# Patient Record
Sex: FEMALE | Race: WHITE | Employment: FULL TIME | URBAN - METROPOLITAN AREA
[De-identification: names, ages, dates, MRNs, and addresses within clinical notes are randomized per-mention and may not be internally consistent; named-entity substitution may affect disease eponyms.]

---

## 2018-01-26 LAB
ABO + RH BLD: NORMAL
ABO + RH BLD: NORMAL
BLD GP AB SCN SERPL QL: NEGATIVE
HBV SURFACE AG SERPL QL IA: NON REACTIVE
HIV 1+2 AB+HIV1 P24 AG SERPL QL IA: NON REACTIVE
RUBELLA ANTIBODY IGG QUANTITATIVE: NORMAL IU/ML
T PALLIDUM IGG SER QL: NON REACTIVE

## 2018-08-10 LAB — GROUP B STREP PCR: NEGATIVE

## 2018-09-08 ENCOUNTER — HOSPITAL ENCOUNTER (INPATIENT)
Facility: CLINIC | Age: 33
LOS: 3 days | Discharge: HOME | End: 2018-09-11
Attending: REGISTERED NURSE | Admitting: REGISTERED NURSE
Payer: COMMERCIAL

## 2018-09-08 ENCOUNTER — ANESTHESIA (OUTPATIENT)
Dept: OBGYN | Facility: CLINIC | Age: 33
End: 2018-09-08
Payer: COMMERCIAL

## 2018-09-08 ENCOUNTER — ANESTHESIA EVENT (OUTPATIENT)
Dept: OBGYN | Facility: CLINIC | Age: 33
End: 2018-09-08
Payer: COMMERCIAL

## 2018-09-08 PROBLEM — O26.90 PREGNANCY RELATED CONDITION: Status: ACTIVE | Noted: 2018-09-08

## 2018-09-08 LAB
ABO + RH BLD: NORMAL
ABO + RH BLD: NORMAL
SPECIMEN EXP DATE BLD: NORMAL

## 2018-09-08 PROCEDURE — G0463 HOSPITAL OUTPT CLINIC VISIT: HCPCS | Mod: 25

## 2018-09-08 PROCEDURE — 3E0R3BZ INTRODUCTION OF ANESTHETIC AGENT INTO SPINAL CANAL, PERCUTANEOUS APPROACH: ICD-10-PCS | Performed by: ANESTHESIOLOGY

## 2018-09-08 PROCEDURE — 86901 BLOOD TYPING SEROLOGIC RH(D): CPT | Performed by: REGISTERED NURSE

## 2018-09-08 PROCEDURE — 25000125 ZZHC RX 250: Performed by: ANESTHESIOLOGY

## 2018-09-08 PROCEDURE — 12000029 ZZH R&B OB INTERMEDIATE

## 2018-09-08 PROCEDURE — 86780 TREPONEMA PALLIDUM: CPT | Performed by: REGISTERED NURSE

## 2018-09-08 PROCEDURE — 25000128 H RX IP 250 OP 636: Performed by: REGISTERED NURSE

## 2018-09-08 PROCEDURE — 25000128 H RX IP 250 OP 636: Performed by: ANESTHESIOLOGY

## 2018-09-08 PROCEDURE — 00HU33Z INSERTION OF INFUSION DEVICE INTO SPINAL CANAL, PERCUTANEOUS APPROACH: ICD-10-PCS | Performed by: ANESTHESIOLOGY

## 2018-09-08 PROCEDURE — 86900 BLOOD TYPING SEROLOGIC ABO: CPT | Performed by: REGISTERED NURSE

## 2018-09-08 PROCEDURE — 37000011 ZZH ANESTHESIA WARD SERVICE

## 2018-09-08 PROCEDURE — 59025 FETAL NON-STRESS TEST: CPT

## 2018-09-08 PROCEDURE — 27110038 ZZH RX 271: Performed by: ANESTHESIOLOGY

## 2018-09-08 PROCEDURE — 36415 COLL VENOUS BLD VENIPUNCTURE: CPT | Performed by: REGISTERED NURSE

## 2018-09-08 RX ORDER — LIDOCAINE HYDROCHLORIDE AND EPINEPHRINE 15; 5 MG/ML; UG/ML
3 INJECTION, SOLUTION EPIDURAL
Status: COMPLETED | OUTPATIENT
Start: 2018-09-08 | End: 2018-09-08

## 2018-09-08 RX ORDER — PRENATAL VIT/IRON FUM/FOLIC AC 27MG-0.8MG
1 TABLET ORAL DAILY
COMMUNITY

## 2018-09-08 RX ORDER — SODIUM CHLORIDE, SODIUM LACTATE, POTASSIUM CHLORIDE, CALCIUM CHLORIDE 600; 310; 30; 20 MG/100ML; MG/100ML; MG/100ML; MG/100ML
INJECTION, SOLUTION INTRAVENOUS CONTINUOUS
Status: DISCONTINUED | OUTPATIENT
Start: 2018-09-08 | End: 2018-09-09

## 2018-09-08 RX ORDER — OXYCODONE AND ACETAMINOPHEN 5; 325 MG/1; MG/1
1 TABLET ORAL
Status: DISCONTINUED | OUTPATIENT
Start: 2018-09-08 | End: 2018-09-09

## 2018-09-08 RX ORDER — OXYTOCIN 10 [USP'U]/ML
10 INJECTION, SOLUTION INTRAMUSCULAR; INTRAVENOUS
Status: DISCONTINUED | OUTPATIENT
Start: 2018-09-08 | End: 2018-09-09

## 2018-09-08 RX ORDER — FENTANYL CITRATE 50 UG/ML
100 INJECTION, SOLUTION INTRAMUSCULAR; INTRAVENOUS ONCE
Status: COMPLETED | OUTPATIENT
Start: 2018-09-08 | End: 2018-09-08

## 2018-09-08 RX ORDER — FENTANYL CITRATE 50 UG/ML
100 INJECTION, SOLUTION INTRAMUSCULAR; INTRAVENOUS ONCE
Status: DISCONTINUED | OUTPATIENT
Start: 2018-09-08 | End: 2018-09-08

## 2018-09-08 RX ORDER — NALOXONE HYDROCHLORIDE 0.4 MG/ML
.1-.4 INJECTION, SOLUTION INTRAMUSCULAR; INTRAVENOUS; SUBCUTANEOUS
Status: DISCONTINUED | OUTPATIENT
Start: 2018-09-08 | End: 2018-09-09

## 2018-09-08 RX ORDER — CARBOPROST TROMETHAMINE 250 UG/ML
250 INJECTION, SOLUTION INTRAMUSCULAR
Status: DISCONTINUED | OUTPATIENT
Start: 2018-09-08 | End: 2018-09-09

## 2018-09-08 RX ORDER — EPHEDRINE SULFATE 50 MG/ML
5 INJECTION, SOLUTION INTRAMUSCULAR; INTRAVENOUS; SUBCUTANEOUS
Status: DISCONTINUED | OUTPATIENT
Start: 2018-09-08 | End: 2018-09-09

## 2018-09-08 RX ORDER — NALBUPHINE HYDROCHLORIDE 10 MG/ML
2.5-5 INJECTION, SOLUTION INTRAMUSCULAR; INTRAVENOUS; SUBCUTANEOUS EVERY 6 HOURS PRN
Status: DISCONTINUED | OUTPATIENT
Start: 2018-09-08 | End: 2018-09-09

## 2018-09-08 RX ORDER — EVE PRIMROSE/LINOLEIC/G-LENIC 1000 MG
CAPSULE ORAL
Status: ON HOLD | COMMUNITY
End: 2018-09-11

## 2018-09-08 RX ORDER — ROPIVACAINE HYDROCHLORIDE 2 MG/ML
10 INJECTION, SOLUTION EPIDURAL; INFILTRATION; PERINEURAL ONCE
Status: COMPLETED | OUTPATIENT
Start: 2018-09-08 | End: 2018-09-08

## 2018-09-08 RX ORDER — IBUPROFEN 400 MG/1
800 TABLET, FILM COATED ORAL
Status: DISCONTINUED | OUTPATIENT
Start: 2018-09-08 | End: 2018-09-09

## 2018-09-08 RX ORDER — ACETAMINOPHEN 325 MG/1
650 TABLET ORAL EVERY 4 HOURS PRN
Status: DISCONTINUED | OUTPATIENT
Start: 2018-09-08 | End: 2018-09-09

## 2018-09-08 RX ORDER — ONDANSETRON 2 MG/ML
4 INJECTION INTRAMUSCULAR; INTRAVENOUS EVERY 6 HOURS PRN
Status: DISCONTINUED | OUTPATIENT
Start: 2018-09-08 | End: 2018-09-09

## 2018-09-08 RX ORDER — ONDANSETRON 2 MG/ML
4 INJECTION INTRAMUSCULAR; INTRAVENOUS EVERY 6 HOURS PRN
Status: DISCONTINUED | OUTPATIENT
Start: 2018-09-08 | End: 2018-09-08

## 2018-09-08 RX ORDER — METHYLERGONOVINE MALEATE 0.2 MG/ML
200 INJECTION INTRAVENOUS
Status: DISCONTINUED | OUTPATIENT
Start: 2018-09-08 | End: 2018-09-09

## 2018-09-08 RX ORDER — OXYTOCIN/0.9 % SODIUM CHLORIDE 30/500 ML
100-340 PLASTIC BAG, INJECTION (ML) INTRAVENOUS CONTINUOUS PRN
Status: COMPLETED | OUTPATIENT
Start: 2018-09-08 | End: 2018-09-09

## 2018-09-08 RX ADMIN — SODIUM CHLORIDE, POTASSIUM CHLORIDE, SODIUM LACTATE AND CALCIUM CHLORIDE: 600; 310; 30; 20 INJECTION, SOLUTION INTRAVENOUS at 17:52

## 2018-09-08 RX ADMIN — FENTANYL CITRATE 100 MCG: 50 INJECTION INTRAMUSCULAR; INTRAVENOUS at 15:34

## 2018-09-08 RX ADMIN — LIDOCAINE HYDROCHLORIDE,EPINEPHRINE BITARTRATE 3 ML: 15; .005 INJECTION, SOLUTION EPIDURAL; INFILTRATION; INTRACAUDAL; PERINEURAL at 17:40

## 2018-09-08 RX ADMIN — SODIUM CHLORIDE, POTASSIUM CHLORIDE, SODIUM LACTATE AND CALCIUM CHLORIDE 1000 ML: 600; 310; 30; 20 INJECTION, SOLUTION INTRAVENOUS at 16:57

## 2018-09-08 RX ADMIN — Medication 12 ML/HR: at 17:44

## 2018-09-08 RX ADMIN — ROPIVACAINE HYDROCHLORIDE 15 ML: 2 INJECTION, SOLUTION EPIDURAL; INFILTRATION at 17:40

## 2018-09-08 RX ADMIN — SODIUM CHLORIDE, POTASSIUM CHLORIDE, SODIUM LACTATE AND CALCIUM CHLORIDE 500 ML: 600; 310; 30; 20 INJECTION, SOLUTION INTRAVENOUS at 22:41

## 2018-09-08 NOTE — ANESTHESIA PREPROCEDURE EVALUATION
Anesthesia Evaluation       history and physical reviewed .      No history of anesthetic complications          ROS/MED HX    ENT/Pulmonary:     (+)asthma , . .    Neurologic:       Cardiovascular:         METS/Exercise Tolerance:     Hematologic:         Musculoskeletal:         GI/Hepatic:         Renal/Genitourinary:         Endo:         Psychiatric:         Infectious Disease:         Malignancy:         Other:                                    Anesthesia Plan      History & Physical Review      ASA Status:  .  OB Epidural Asa: 2            Postoperative Care      Consents  Anesthetic plan, risks, benefits and alternatives discussed with:  Patient..                          .

## 2018-09-08 NOTE — ANESTHESIA PROCEDURE NOTES
Peripheral nerve/Neuraxial procedure note : epidural catheter  Pre-Procedure  Performed by BILLY GROSS  Location: OB      Pre-Anesthestic Checklist: patient identified, IV checked, risks and benefits discussed, informed consent and monitors and equipment checked    Timeout  Correct Patient: Yes   Correct Procedure: Yes   Correct Site: Yes   Correct Laterality: N/A   Correct Position: Yes   Site Marked: N/A   .   Procedure Documentation    .    Procedure:    Epidural catheter.  Insertion Site:L3-4  (midline approach) Injection technique: LORT saline   Local skin infiltrated with 3 mL of 1% lidocaine.  ELOY at 5 cm     Patient Prep;povidone-iodine 7.5% surgical scrub.  .  Needle: ToMetaCerty needle Needle Gauge: 17.    Needle Length (Inches) 3.5  # of attempts: 1 and # of redirects:  .   . .  Catheter threaded easily  5 cm epidural space.  10 cm at skin.   .    Assessment/Narrative  Paresthesias: No.  .  .  Aspiration negative for heme or CSF  . Test dose of 3 mL lidocaine 1.5% w/ 1:200,000 epinephrine at. Test dose negative for signs of intravascular, subdural or intrathecal injection. Comments:  Labor Epidural  No complications.

## 2018-09-08 NOTE — IP AVS SNAPSHOT
MRN:[id number]                      After Visit Summary   9/8/2018    Oralia Wright    MRN: [id number]           Thank you!     Thank you for choosing [facility] for your care. Our goal is always to provide you with excellent care. Hearing back from our patients is one way we can continue to improve our services. Please take a few minutes to complete the written survey that you may receive in the mail after you visit with us. Thank you!        Patient Information     Date Of Birth          1985        Designated Caregiver       Most Recent Value    Caregiver    Will someone help with your care after discharge? yes    Name of designated caregiver Eloy Wright    Phone number of caregiver [phone]    Caregiver address [address] [phone]      About your hospital stay     You were admitted on:  September 8, 2018 You last received care in the:  [address]    You were discharged on:  September 11, 2018        Reason for your hospital stay       Maternity care                  Who to Call     For medical emergencies, please call 911.  For non-urgent questions about your medical care, please call your primary care provider or clinic, None          Attending Provider     Provider Specialty    Pema Ceron APRN [facility]       Primary Care Provider Fax #    [facility] [phone]      After Care Instructions     Activity       Review discharge instructions            Diet       Resume previous diet            Discharge Instructions - Postpartum visit       Please schedule appointment in clinic at 6 weeks postpartum or sooner as needed.                  Follow-up Appointments     Follow Up and recommended labs and tests       Please schedule appointment in clinic at 6 weeks postpartum or sooner as needed.                  Further instructions from your care team       Postpartum Vaginal Delivery  Instructions    Activity       Ask family and friends for help when you need it.    Do not place anything in your vagina for 6 weeks.    You are not restricted on other activities, but take it easy for a few weeks to allow your body to recover from delivery.  You are able to do any activities you feel up to that point.    No driving until you have stopped taking your pain medications (usually two weeks after delivery).     Call your health care provider if you have any of these symptoms:       Increased pain, swelling, redness, or fluid around your stiches from an episiotomy or perineal tear.    A fever above 100.4 F (38 C) with or without chills when placing a thermometer under your tongue.    You soak a sanitary pad with blood within 1 hour, or you see blood clots larger than a golf ball.    Bleeding that lasts more than 6 weeks.    Vaginal discharge that smells bad.    Severe pain, cramping or tenderness in your lower belly area.    A need to urinate more frequently (use the toilet more often), more urgently (use the toilet very quickly), or it burns when you urinate.    Nausea and vomiting.    Redness, swelling or pain around a vein in your leg.    Problems breastfeeding or a red or painful area on your breast.    Chest pain and cough or are gasping for air.    Problems coping with sadness, anxiety, or depression.  If you have any concerns about hurting yourself or the baby, call your provider immediately.     You have questions or concerns after you return home.     Keep your hands clean:  Always wash your hands before touching your perineal area and stitches.  This helps reduce your risk of infection.  If your hands aren't dirty, you may use an alcohol hand-rub to clean your hands. Keep your nails clean and short.        Pending Results     No orders found from 9/6/2018 to 9/9/2018.            Statement of Approval     Ordered          09/11/18 0830  I have reviewed and agree with all the recommendations and  "orders detailed in this document.  EFFECTIVE NOW     Approved and electronically signed by:  Felicia Perkins APRN CNM             Admission Information     Date & Time Provider Department Dept. Phone    2018 Pema Ceron APRN CNM [address] [phone]      Your Vitals Were     Blood Pressure Pulse Temperature Respirations Height Weight    111/67 83 98.1  F (36.7  C) (Oral) 16 1.626 m (5' 4\") 78.9 kg (174 lb)    Pulse Oximetry BMI (Body Mass Index)                100% 29.87 kg/m2          [employer] Information     [employer] lets you send messages to your doctor, view your test results, renew your prescriptions, schedule appointments and more. To sign up, go to [email or web address] . Click on \"Log in\" on the left side of the screen, which will take you to the Welcome page. Then click on \"Sign up Now\" on the right side of the page.     You will be asked to enter the access code listed below, as well as some personal information. Please follow the directions to create your username and password.     Your access code is: [email or web address]  Expires: 12/10/2018  1:20 PM     Your access code will  in 90 days. If you need help or a new code, please call your [place] clinic or [phone].        Care EveryWhere ID     This is your Care EveryWhere ID. This could be used by other organizations to access your [place] medical records  [id number]        Equal Access to Services     [facility] : Hadii tania kco Socharlyali, waaxda luqadaha, qaybta kaalmada adeegyarachel, mary lou mckeon. So [facility] [phone].    ATENCIÓN: Si habla español, tiene a richter disposición servicios gratuitos de asistencia lingüística. Llame al [phone].    We comply with applicable federal civil rights laws and [place] laws. We do not discriminate on the basis of race, color, national origin, age, disability, sex, sexual orientation, or gender identity.             "   Review of your medicines      START taking        Dose / Directions    acetaminophen 325 MG tablet   Commonly known as:  TYLENOL        Dose:  650 mg   Take 2 tablets (650 mg) by mouth every 6 hours as needed for mild pain or fever (greater than or equal to 38? C /100.4? F (oral) or 38.5? C/ 101.4? F (core).)   Quantity:  100 tablet   Refills:  0       ferrous sulfate 325 (65 Fe) MG tablet   Commonly known as:  IRON        Dose:  325 mg   Take 1 tablet (325 mg) by mouth 2 times daily   Quantity:  30 tablet   Refills:  2       ibuprofen 600 MG tablet   Commonly known as:  ADVIL/MOTRIN        Dose:  600 mg   Take 1 tablet (600 mg) by mouth every 6 hours as needed for moderate pain or other (cramping)   Refills:  0       oxyCODONE IR 5 MG tablet   Commonly known as:  ROXICODONE        Dose:  5 mg   Take 1 tablet (5 mg) by mouth every 6 hours as needed for severe pain or other (pain control or improvement in physical function.)   Quantity:  12 tablet   Refills:  0       senna-docusate 8.6-50 MG per tablet   Commonly known as:  SENOKOT-S;PERICOLACE        Dose:  1 tablet   Take 1 tablet by mouth 2 times daily as needed for constipation   Quantity:  100 tablet   Refills:  0         CONTINUE these medicines which have NOT CHANGED        Dose / Directions    adapalene 0.1 % cream   Commonly known as:  DIFFERIN        Apply topically 2 times daily   Refills:  0       ADDERALL XR PO        Dose:  30 mg   Take 30 mg by mouth 2 times daily   Refills:  0       albuterol 108 (90 Base) MCG/ACT inhaler   Commonly known as:  PROAIR HFA/PROVENTIL HFA/VENTOLIN HFA        Dose:  2 puff   Inhale 2 puffs into the lungs every 6 hours   Refills:  0       clindamycin 1 % solution   Commonly known as:  CLEOCIN T        Apply topically 2 times daily   Refills:  0       mometasone-formoterol 100-5 MCG/ACT oral inhaler   Commonly known as:  DULERA        Dose:  2 puff   Inhale 2 puffs into the lungs 2 times daily   Refills:  0       prenatal  multivitamin plus iron 27-0.8 MG Tabs per tablet   Indication:  Pregnancy        Dose:  1 tablet   Take 1 tablet by mouth daily   Refills:  0       SERTRALINE HCL PO        Dose:  50 mg   Take 50 mg by mouth daily   Refills:  0         STOP taking     Evening Primrose Oil 1000 MG Caps                Where to get your medicines      Some of these will need a paper prescription and others can be bought over the counter. Ask your nurse if you have questions.     Bring a paper prescription for each of these medications     oxyCODONE IR 5 MG tablet       You don't need a prescription for these medications     acetaminophen 325 MG tablet    ferrous sulfate 325 (65 Fe) MG tablet    ibuprofen 600 MG tablet    senna-docusate 8.6-50 MG per tablet                Protect others around you: Learn how to safely use, store and throw away your medicines at www.disposemymeds.org.        Information about OPIOIDS     PRESCRIPTION OPIOIDS: WHAT YOU NEED TO KNOW   We gave you an opioid (narcotic) pain medicine. It is important to manage your pain, but opioids are not always the best choice. You should first try all the other options your care team gave you. Take this medicine for as short a time (and as few doses) as possible.    Some activities can increase your pain, such as bandage changes or therapy sessions. It may help to take your pain medicine 30 to 60 minutes before these activities. Reduce your stress by getting enough sleep, working on hobbies you enjoy and practicing relaxation or meditation. Talk to your care team about ways to manage your pain beyond prescription opioids.    These medicines have risks:    DO NOT drive when on new or higher doses of pain medicine. These medicines can affect your alertness and reaction times, and you could be arrested for driving under the influence (DUI). If you need to use opioids long-term, talk to your care team about driving.    DO NOT operate heavy machinery    DO NOT do any other  dangerous activities while taking these medicines.    DO NOT drink any alcohol while taking these medicines.     If the opioid prescribed includes acetaminophen, DO NOT take with any other medicines that contain acetaminophen. Read all labels carefully. Look for the word  acetaminophen  or  Tylenol.  Ask your pharmacist if you have questions or are unsure.    You can get addicted to pain medicines, especially if you have a history of addiction (chemical, alcohol or substance dependence). Talk to your care team about ways to reduce this risk.    All opioids tend to cause constipation. Drink plenty of water and eat foods that have a lot of fiber, such as fruits, vegetables, prune juice, apple juice and high-fiber cereal. Take a laxative (Miralax, milk of magnesia, Colace, Senna) if you don t move your bowels at least every other day. Other side effects include upset stomach, sleepiness, dizziness, throwing up, tolerance (needing more of the medicine to have the same effect), physical dependence and slowed breathing.    Store your pills in a secure place, locked if possible. We will not replace any lost or stolen medicine. If you don t finish your medicine, please throw away (dispose) as directed by your pharmacist. The [place] Pollution Control Agency has more information about safe disposal: https://www.pca.[facility].[place].us/living-green/managing-unwanted-medications             Medication List: This is a list of all your medications and when to take them. Check marks below indicate your daily home schedule. Keep this list as a reference.      Medications           Morning Afternoon Evening Bedtime As Needed    acetaminophen 325 MG tablet   Commonly known as:  TYLENOL   Take 2 tablets (650 mg) by mouth every 6 hours as needed for mild pain or fever (greater than or equal to 38? C /100.4? F (oral) or 38.5? C/ 101.4? F (core).)   Last time this was given:  975 mg on 9/11/2018  7:18 AM                                 adapalene 0.1 % cream   Commonly known as:  DIFFERIN   Apply topically 2 times daily                                ADDERALL XR PO   Take 30 mg by mouth 2 times daily                                albuterol 108 (90 Base) MCG/ACT inhaler   Commonly known as:  PROAIR HFA/PROVENTIL HFA/VENTOLIN HFA   Inhale 2 puffs into the lungs every 6 hours                                clindamycin 1 % solution   Commonly known as:  CLEOCIN T   Apply topically 2 times daily                                ferrous sulfate 325 (65 Fe) MG tablet   Commonly known as:  IRON   Take 1 tablet (325 mg) by mouth 2 times daily                                ibuprofen 600 MG tablet   Commonly known as:  ADVIL/MOTRIN   Take 1 tablet (600 mg) by mouth every 6 hours as needed for moderate pain or other (cramping)   Last time this was given:  800 mg on 9/11/2018  7:18 AM                                mometasone-formoterol 100-5 MCG/ACT oral inhaler   Commonly known as:  DULERA   Inhale 2 puffs into the lungs 2 times daily                                oxyCODONE IR 5 MG tablet   Commonly known as:  ROXICODONE   Take 1 tablet (5 mg) by mouth every 6 hours as needed for severe pain or other (pain control or improvement in physical function.)   Last time this was given:  5 mg on 9/11/2018 12:00 PM                                prenatal multivitamin plus iron 27-0.8 MG Tabs per tablet   Take 1 tablet by mouth daily                                senna-docusate 8.6-50 MG per tablet   Commonly known as:  SENOKOT-S;PERICOLACE   Take 1 tablet by mouth 2 times daily as needed for constipation   Last time this was given:  2 tablets on 9/11/2018  9:36 AM                                SERTRALINE HCL PO   Take 50 mg by mouth daily

## 2018-09-08 NOTE — PROGRESS NOTES
1025- 40+5 weeks pt of Pema Flescher to [facility] from home stating she has been naina since about 0100 today.  Denies leaking fluid states she has had some red blood on panties.  Denies problems with pregnancy.  EUM/US explained and applied with pt permission.  Admission data base obtained.  See flow sheet.   1105- SVE 1+ 95%.  UC q 5-6 minutes, palp mild to moderate.  Pt breaths well through contractions.  Denies needs at this time.  Support given.  Call to Pema. Updated on pt arrival, SVE, contraction pattern, pain and reassuring NST but not reactive at this time.  Orders received to observe and recheck cervix in one and updated.  1215- POC reviewed with pt. Water and juice given.  1250- Pema on unit.  POC reviewed with this nurse  1305-SVE no change.  Pema at bedside.  POC reviewed with pt.

## 2018-09-08 NOTE — H&P
[facility] Labor and Delivery History and Physical    Oralia Wright MRN# [id number]   Age: 33 year old YOB: 1985     Date of Admission:  2018    Primary care provider: [facility]           Chief Complaint:   Oralia Wright is a 33 year old  who is 40w5d pregnant and being admitted for observation. She called after hours number at 0730 this morning c/o painful ctxs every 5-6mins. At that time, she was having difficulty talking to me on the phone. I suggested that she go to [facility] for evaluation. She did not show up, so I called back at 0925. She had gone to her parents house which is closer to the hospital but decided to wait to go in. At that time she reported that ctxs had spaced out to 7-8 min apart. She was having some light bleeding, +fm, denied rectal pressure. At 1015 she called back to let me know that she was on the way to the hospital. When she arrived she was 1.5/95/-3, posterior and soft, per RN report (Jacobs 6). She was 1/70/-4, vertex in the office yesterday. Pt was breathing through ctxs. Decision made to recheck cervix in 1 hr and there was no change.           Pregnancy history:     OBSTETRIC HISTORY: CPC seen on 20 week US. These resolved. Pt has had some mild anemia.     Obstetric History       T0      L0     SAB0   TAB0   Ectopic0   Multiple0   Live Births0       # Outcome Date GA Lbr Eb/2nd Weight Sex Delivery Anes PTL Lv   1 Current                   EDC: Estimated Date of Delivery: Sep 3, 2018    Prenatal Labs:   Lab Results   Component Value Date    ABO A 2018    RH Pos 2018    AS negative 2018    HEPBANG non reactive 2018    CHPCRT  2005     Negative for C. trachomatis rRNA by transcription mediated amplification.    GCPCRT  2005     Negative for N. gonorrhoeae rRNA by transcription mediated amplification.    TREPAB non reactive 2018    HGB 12.1 2005  "      GBS Status: Negative      Active Problem List  Patient Active Problem List   Diagnosis     Pregnancy related condition       Medication Prior to Admission  Prescriptions Prior to Admission   Medication Sig Dispense Refill Last Dose     Evening Primrose Oil 1000 MG CAPS    9/7/2018 at Unknown time     Prenatal Vit-Fe Fumarate-FA (PRENATAL MULTIVITAMIN PLUS IRON) 27-0.8 MG TABS per tablet Take 1 tablet by mouth daily   9/7/2018 at Unknown time   .        Maternal Past Medical History:     Past Medical History:   Diagnosis Date     Acne      Attention deficit      Depression     stable at present     Uncomplicated asthma     exercise induced                          Social History:   I have reviewed this patient's social history          Physical Exam:   Vitals were reviewed  Blood pressure 116/70, temperature 98.4  F (36.9  C), temperature source Temporal, resp. rate 18, height 1.626 m (5' 4\"), weight 78.9 kg (174 lb).  Constitutional:   awake, alert, cooperative, struggles and needs to vocalize with ctxs, and appears stated age      Cervix: deferred  Fetal Heart Rate Tracing: Category 1  Tocometer: frequency q 4-6 minutes                       Assessment:   Oralia Wright is a 40w5d pregnant female admitted for observation and pain management in probable early labor. Gave pt the option of going home vs staying as an observation pt or being admitted as a labor pt and augmented with Pitocin. She opts to be admitted as an observation pt. Advised her that if she is not in active labor by evening but ctxs are still regular and painful, she should consider augmentation. She understands that she can only be an observation pt for 24 hrs, before a new plan will need to be made.           Plan:   Observation  Pain medication is acceptable including fentanyl and nitrous oxide. NO EPIDURAL.   Dr Otto aware of plan.     Pema Ceron, WILLIAM PADILLAM  "

## 2018-09-08 NOTE — IP AVS SNAPSHOT
[address]    [place] [address]    Phone:  [phone]                                       After Visit Summary   9/8/2018    Oralia Wright    MRN: [id number]           After Visit Summary Signature Page     I have received my discharge instructions, and my questions have been answered. I have discussed any challenges I see with this plan with the nurse or doctor.    ..........................................................................................................................................  Patient/Patient Representative Signature      ..........................................................................................................................................  Patient Representative Print Name and Relationship to Patient    ..................................................               ................................................  Date                                   Time    ..........................................................................................................................................  Reviewed by Signature/Title    ...................................................              ..............................................  Date                                               Time          22EPIC Rev 08/18

## 2018-09-08 NOTE — PROGRESS NOTES
"OB Progress Note  2018  3:32 PM    S:  Pt vocalizing and struggling with ctxs. RN exam was 2/95/-2, midline. No other complaints. RN noted some fluid when she did exam but pt had just gotten out of bathtub so this may have just been bath water. Pt requesting Fentanyl.       O:  /70  Temp 98.4  F (36.9  C) (Temporal)  Resp 18  Ht 1.626 m (5' 4\")  Wt 78.9 kg (174 lb)  BMI 29.87 kg/m2  EFM: Category 1  [place]:  Ctx q2-5 min  Membranes: Possibly ruptured    A/P:  33 year old  @40w5d admitted for observation and pain management.   1.  Routine cares  2.  Discussed admission with patient. She is ok with starting Pitocin or AROM at 2100 if she is still not in active labor.  3.  Fentanyl as needed.     Pema Ceron    "

## 2018-09-08 NOTE — PLAN OF CARE
Spoke to Jana Flescher via phone. RE: pt requesting pain medication and is nauseated. Pema would like pt to try the tub before starting IV and administering pain meds, unless pt is opposed to that plan. Telephone orders received for one time PRN dose of IV Fentanyl. See orders.

## 2018-09-09 LAB
HGB BLD-MCNC: 9.6 G/DL (ref 11.7–15.7)
T PALLIDUM AB SER QL: NONREACTIVE

## 2018-09-09 PROCEDURE — 0KQM0ZZ REPAIR PERINEUM MUSCLE, OPEN APPROACH: ICD-10-PCS | Performed by: REGISTERED NURSE

## 2018-09-09 PROCEDURE — 12000037 ZZH R&B POSTPARTUM INTERMEDIATE

## 2018-09-09 PROCEDURE — 88307 TISSUE EXAM BY PATHOLOGIST: CPT | Performed by: REGISTERED NURSE

## 2018-09-09 PROCEDURE — 25000128 H RX IP 250 OP 636: Performed by: REGISTERED NURSE

## 2018-09-09 PROCEDURE — 85018 HEMOGLOBIN: CPT | Performed by: REGISTERED NURSE

## 2018-09-09 PROCEDURE — 25000132 ZZH RX MED GY IP 250 OP 250 PS 637: Performed by: REGISTERED NURSE

## 2018-09-09 PROCEDURE — 72200001 ZZH LABOR CARE VAGINAL DELIVERY SINGLE

## 2018-09-09 PROCEDURE — 25000125 ZZHC RX 250: Performed by: REGISTERED NURSE

## 2018-09-09 PROCEDURE — 88307 TISSUE EXAM BY PATHOLOGIST: CPT | Mod: 26 | Performed by: REGISTERED NURSE

## 2018-09-09 RX ORDER — MISOPROSTOL 200 UG/1
800 TABLET ORAL
Status: COMPLETED | OUTPATIENT
Start: 2018-09-09 | End: 2018-09-09

## 2018-09-09 RX ORDER — AMOXICILLIN 250 MG
2 CAPSULE ORAL 2 TIMES DAILY
Status: DISCONTINUED | OUTPATIENT
Start: 2018-09-09 | End: 2018-09-11 | Stop reason: HOSPADM

## 2018-09-09 RX ORDER — GENTAMICIN SULFATE 60 MG/50ML
1.5 INJECTION, SOLUTION INTRAVENOUS EVERY 8 HOURS
Status: DISCONTINUED | OUTPATIENT
Start: 2018-09-09 | End: 2018-09-09

## 2018-09-09 RX ORDER — OXYTOCIN/0.9 % SODIUM CHLORIDE 30/500 ML
100 PLASTIC BAG, INJECTION (ML) INTRAVENOUS CONTINUOUS
Status: DISCONTINUED | OUTPATIENT
Start: 2018-09-09 | End: 2018-09-11 | Stop reason: HOSPADM

## 2018-09-09 RX ORDER — ACETAMINOPHEN 325 MG/1
650 TABLET ORAL EVERY 4 HOURS PRN
Status: DISCONTINUED | OUTPATIENT
Start: 2018-09-09 | End: 2018-09-11 | Stop reason: HOSPADM

## 2018-09-09 RX ORDER — ACETAMINOPHEN 325 MG/1
975 TABLET ORAL EVERY 6 HOURS
Status: DISCONTINUED | OUTPATIENT
Start: 2018-09-09 | End: 2018-09-09

## 2018-09-09 RX ORDER — BISACODYL 10 MG
10 SUPPOSITORY, RECTAL RECTAL DAILY PRN
Status: DISCONTINUED | OUTPATIENT
Start: 2018-09-11 | End: 2018-09-11 | Stop reason: HOSPADM

## 2018-09-09 RX ORDER — NALOXONE HYDROCHLORIDE 0.4 MG/ML
.1-.4 INJECTION, SOLUTION INTRAMUSCULAR; INTRAVENOUS; SUBCUTANEOUS
Status: DISCONTINUED | OUTPATIENT
Start: 2018-09-09 | End: 2018-09-11 | Stop reason: HOSPADM

## 2018-09-09 RX ORDER — AMOXICILLIN 250 MG
1 CAPSULE ORAL 2 TIMES DAILY
Status: DISCONTINUED | OUTPATIENT
Start: 2018-09-09 | End: 2018-09-11 | Stop reason: HOSPADM

## 2018-09-09 RX ORDER — OXYCODONE HYDROCHLORIDE 5 MG/1
5 TABLET ORAL EVERY 4 HOURS PRN
Status: DISCONTINUED | OUTPATIENT
Start: 2018-09-09 | End: 2018-09-11 | Stop reason: HOSPADM

## 2018-09-09 RX ORDER — OXYTOCIN 10 [USP'U]/ML
10 INJECTION, SOLUTION INTRAMUSCULAR; INTRAVENOUS
Status: DISCONTINUED | OUTPATIENT
Start: 2018-09-09 | End: 2018-09-11 | Stop reason: HOSPADM

## 2018-09-09 RX ORDER — AMPICILLIN 2 G/1
2 INJECTION, POWDER, FOR SOLUTION INTRAVENOUS EVERY 6 HOURS
Status: DISCONTINUED | OUTPATIENT
Start: 2018-09-09 | End: 2018-09-09

## 2018-09-09 RX ORDER — IBUPROFEN 400 MG/1
800 TABLET, FILM COATED ORAL EVERY 6 HOURS PRN
Status: DISCONTINUED | OUTPATIENT
Start: 2018-09-09 | End: 2018-09-11 | Stop reason: HOSPADM

## 2018-09-09 RX ORDER — OXYTOCIN/0.9 % SODIUM CHLORIDE 30/500 ML
340 PLASTIC BAG, INJECTION (ML) INTRAVENOUS CONTINUOUS PRN
Status: DISCONTINUED | OUTPATIENT
Start: 2018-09-09 | End: 2018-09-11 | Stop reason: HOSPADM

## 2018-09-09 RX ORDER — HYDROCORTISONE 2.5 %
CREAM (GRAM) TOPICAL 3 TIMES DAILY PRN
Status: DISCONTINUED | OUTPATIENT
Start: 2018-09-09 | End: 2018-09-11 | Stop reason: HOSPADM

## 2018-09-09 RX ORDER — ACETAMINOPHEN 325 MG/1
975 TABLET ORAL EVERY 6 HOURS
Status: DISCONTINUED | OUTPATIENT
Start: 2018-09-09 | End: 2018-09-11

## 2018-09-09 RX ORDER — LANOLIN 100 %
OINTMENT (GRAM) TOPICAL
Status: DISCONTINUED | OUTPATIENT
Start: 2018-09-09 | End: 2018-09-11 | Stop reason: HOSPADM

## 2018-09-09 RX ADMIN — MISOPROSTOL 800 MCG: 200 TABLET ORAL at 02:31

## 2018-09-09 RX ADMIN — OXYCODONE HYDROCHLORIDE 5 MG: 5 TABLET ORAL at 21:56

## 2018-09-09 RX ADMIN — OXYTOCIN-SODIUM CHLORIDE 0.9% IV SOLN 30 UNIT/500ML 340 ML/HR: 30-0.9/5 SOLUTION at 02:27

## 2018-09-09 RX ADMIN — ACETAMINOPHEN 975 MG: 325 TABLET, FILM COATED ORAL at 22:17

## 2018-09-09 RX ADMIN — OXYCODONE HYDROCHLORIDE 5 MG: 5 TABLET ORAL at 03:05

## 2018-09-09 RX ADMIN — AMPICILLIN SODIUM 2 G: 2 INJECTION, POWDER, FOR SOLUTION INTRAMUSCULAR; INTRAVENOUS at 03:06

## 2018-09-09 RX ADMIN — IBUPROFEN 800 MG: 400 TABLET ORAL at 16:11

## 2018-09-09 RX ADMIN — OXYCODONE HYDROCHLORIDE 5 MG: 5 TABLET ORAL at 07:24

## 2018-09-09 RX ADMIN — SENNOSIDES AND DOCUSATE SODIUM 1 TABLET: 8.6; 5 TABLET ORAL at 09:49

## 2018-09-09 RX ADMIN — ACETAMINOPHEN 975 MG: 325 TABLET, FILM COATED ORAL at 16:11

## 2018-09-09 RX ADMIN — OXYTOCIN-SODIUM CHLORIDE 0.9% IV SOLN 30 UNIT/500ML 100 ML/HR: 30-0.9/5 SOLUTION at 03:12

## 2018-09-09 RX ADMIN — LIDOCAINE HYDROCHLORIDE 20 ML: 10 INJECTION, SOLUTION INFILTRATION; PERINEURAL at 02:05

## 2018-09-09 RX ADMIN — IBUPROFEN 800 MG: 400 TABLET ORAL at 03:05

## 2018-09-09 RX ADMIN — IBUPROFEN 800 MG: 400 TABLET ORAL at 22:17

## 2018-09-09 RX ADMIN — GENTAMICIN SULFATE 160 MG: 40 INJECTION, SOLUTION INTRAMUSCULAR; INTRAVENOUS at 02:27

## 2018-09-09 RX ADMIN — ACETAMINOPHEN 975 MG: 325 TABLET, FILM COATED ORAL at 09:48

## 2018-09-09 RX ADMIN — ACETAMINOPHEN 650 MG: 325 TABLET, FILM COATED ORAL at 03:05

## 2018-09-09 RX ADMIN — IBUPROFEN 800 MG: 400 TABLET ORAL at 09:48

## 2018-09-09 RX ADMIN — SENNOSIDES AND DOCUSATE SODIUM 1 TABLET: 8.6; 5 TABLET ORAL at 20:55

## 2018-09-09 RX ADMIN — OXYCODONE HYDROCHLORIDE 5 MG: 5 TABLET ORAL at 11:12

## 2018-09-09 RX ADMIN — OXYCODONE HYDROCHLORIDE 5 MG: 5 TABLET ORAL at 18:43

## 2018-09-09 RX ADMIN — OXYCODONE HYDROCHLORIDE 5 MG: 5 TABLET ORAL at 14:45

## 2018-09-09 NOTE — PLAN OF CARE
Updated Jana Flescher on SVE, starting IV for dose of IV fentanyl. Reporting of to Rupal MENJIVAR RN.

## 2018-09-09 NOTE — PLAN OF CARE
Problem: Patient Care Overview  Goal: Plan of Care/Patient Progress Review  Outcome: No Change  VSS. Fundus firm @ U, bleeding scant.  Pain well controlled with oxycodone and tylenol.  Pt instructed to call for help before getting up.  Breastfeeding attempt in NICU.  providing support at beside. Questions encouraged. On pathway. Continue to monitor and notify MD as needed.

## 2018-09-09 NOTE — PLAN OF CARE
Problem: Labor (Cervical Ripen, Induct, Augment) (Adult,Obstetrics,Pediatric)  Goal: Signs and Symptoms of Listed Potential Problems Will be Absent, Minimized or Managed (Labor)  Signs and symptoms of listed potential problems will be absent, minimized or managed by discharge/transition of care (reference Labor (Cervical Ripen, Induct, Augment) (Adult,Obstetrics,Pediatric) CPG).   Outcome: Completed Date Met: 09/09/18  Patient was complete at 2230 and started pushing at 0036.  At 0045 temp was taken and is per flowsheet.  2 temps were taken and both were above guidelines.  Patient then was diagnosed with chorioamnionitis and IV antibiotics ordered.  Patient delivered baby girl at 0201.  Placenta was not delivered till 0226.  At that time large amount of blood was seen.  Patient had QBL of 1618.  Cytotec given and also pitocin.  Bleeding now WNL.

## 2018-09-09 NOTE — PLAN OF CARE
Problem: Patient Care Overview  Goal: Plan of Care/Patient Progress Review  Outcome: No Change  PT VSS, firm fundus @U w/scant rubra flow, voiding this shift, pain managed w/Tylenol, Ibuprofen & Oxycodone, Senna given, pt pumping, AM Hgb 9.6 ,up in room & going down to NICU breast attempting, spouse at bedside & supportive, continue to monitor.

## 2018-09-09 NOTE — PLAN OF CARE
Problem: Patient Care Overview  Goal: Plan of Care/Patient Progress Review  Outcome: No Change  Patient taking Tylenol, Ibuprofen, and Oxycodone for pain with adequate pain relief. Aqua K pad and abdominal binder given to also help with pain relief. Infant transferred up from NICU this evening. Mother pumping after feedings. Patient up ambulating in room. Encouraged patient to call with needs/questions. Call light within reach, will continue to monitor.

## 2018-09-09 NOTE — LACTATION NOTE
Initial visit with Emeli.  Baby in NICU 40+ weeks.  Breastfeeding general information reviewed.   Advised to breastfeed  Then pump and hand express.    Encouraged rooming in, skin to skin, pump 8-12x/day.  Explained benefits of holding and skin to skin.  Encouraged lots of skin to skin. Instructed on hand expression.   Continues to nurse well per mom. No further questions at this time.   Will follow as needed.   Yoana Pennington BSN, RN, PHN, RNC-MNN, IBCLC

## 2018-09-09 NOTE — PLAN OF CARE
Pt. admitted from L&D  via wheelchair @ 0795 and transferred to bed. Pt. was accompanied by  Nate, and friend and arrived with personal belongings. Report was taken from Mahnaz in L&D. VSS. Fundus is firm and midline.  Vaginal bleeding is scant.  IV infusing oxytocin. Baby in NICU.  Pt. oriented to the room and call light system. Feeding plan with baby in NICU discussed with patient. Questions encouraged.

## 2018-09-09 NOTE — L&D DELIVERY NOTE
DELIVERY NOTE    FIRST STAGE:  Oralia Wright is a 33 year old G1, now  who was admitted yesterday at 40w5d pregnant for labor management. She called after hours number at 0730 this morning c/o painful ctxs every 5-6mins. At that time, she was having difficulty talking to me on the phone. I suggested that she go to [facility] for evaluation. She did not show up, so I called back at 0925. She had gone to her parents house which is closer to the hospital but decided to wait to go in. At that time she reported that ctxs had spaced out to 7-8 min apart. She was having some light bleeding, +fm, denied rectal pressure. At 1015 she called back to let me know that she was on the way to the hospital. When she arrived she was 1.5/95/-3, posterior and soft, per RN report (Jacobs 6). She was 1/70/-4, vertex in the office yesterday. Pt was breathing through ctxs. Pt was having a difficult time with pain so she was admitted. Her labor became active later that evening and she received an epidural. By 2230 she was complete. Category 1 tracing.    SECOND STAGE: Pt labored down for 2 hours and then began pushing. She had very good expulsive efforts. During this time she had two temperatures one hour apart that met criteria for chorioamnionitis. Treatment was initiated. Catgory 2 tracing but no fetal tachycardia. At 0201 on 2018,  of viable female infant inj vertex presentation over 2nd degree perineal laceration, nuchal cord unable to reduce, so baby somersaulted onto perineum. Apgars 8&9, Weight 7#7oz.     THIRD STAGE:  Placenta delivered at 0226 spontaneously into vagina. Gentle cord traction and maternal expulsive efforts released it from vaginal vault. A large amount of clots followed the placenta. Pitocin immediately administered.  Uterus firmed quickly with massage. Cytotec was administered and orders given for another bag of pitocin to run after the first. EBL 1600ml. Placenta appears intact but has obvious  succenturiate lobe. Dr. Otto notified in case postpartum bleeding becomes abnormal.  Hgb ordered.     Pema Ceron CNM

## 2018-09-10 PROCEDURE — 12000037 ZZH R&B POSTPARTUM INTERMEDIATE

## 2018-09-10 PROCEDURE — 25000132 ZZH RX MED GY IP 250 OP 250 PS 637: Performed by: REGISTERED NURSE

## 2018-09-10 RX ADMIN — ACETAMINOPHEN 975 MG: 325 TABLET, FILM COATED ORAL at 12:44

## 2018-09-10 RX ADMIN — IBUPROFEN 800 MG: 400 TABLET ORAL at 18:53

## 2018-09-10 RX ADMIN — SENNOSIDES AND DOCUSATE SODIUM 1 TABLET: 8.6; 5 TABLET ORAL at 19:55

## 2018-09-10 RX ADMIN — OXYCODONE HYDROCHLORIDE 5 MG: 5 TABLET ORAL at 02:10

## 2018-09-10 RX ADMIN — OXYCODONE HYDROCHLORIDE 5 MG: 5 TABLET ORAL at 18:53

## 2018-09-10 RX ADMIN — SENNOSIDES AND DOCUSATE SODIUM 1 TABLET: 8.6; 5 TABLET ORAL at 08:04

## 2018-09-10 RX ADMIN — IBUPROFEN 800 MG: 400 TABLET ORAL at 06:45

## 2018-09-10 RX ADMIN — ACETAMINOPHEN 975 MG: 325 TABLET, FILM COATED ORAL at 06:45

## 2018-09-10 RX ADMIN — IBUPROFEN 800 MG: 400 TABLET ORAL at 12:44

## 2018-09-10 RX ADMIN — OXYCODONE HYDROCHLORIDE 5 MG: 5 TABLET ORAL at 22:57

## 2018-09-10 RX ADMIN — ACETAMINOPHEN 975 MG: 325 TABLET, FILM COATED ORAL at 18:53

## 2018-09-10 RX ADMIN — OXYCODONE HYDROCHLORIDE 5 MG: 5 TABLET ORAL at 14:55

## 2018-09-10 RX ADMIN — OXYCODONE HYDROCHLORIDE 5 MG: 5 TABLET ORAL at 11:12

## 2018-09-10 RX ADMIN — OXYCODONE HYDROCHLORIDE 5 MG: 5 TABLET ORAL at 06:45

## 2018-09-10 NOTE — ANESTHESIA POSTPROCEDURE EVALUATION
Patient: Oralia Wright    * No procedures listed *    Diagnosis:* No pre-op diagnosis entered *  Diagnosis Additional Information: No value filed.    Anesthesia Type:  No value filed.    Note:  Anesthesia Post Evaluation    Patient location during evaluation: Floor  Patient participation: Able to fully participate in evaluation     Anesthetic complications: None    Comments: Pt doing well.  Denies epidural-related complaints.        Last vitals:  Vitals:    09/09/18 0800 09/09/18 1630 09/10/18 0145   BP: 103/55 99/62 109/63   Pulse:  82 70   Resp: 16 16 16   Temp: 36.8  C (98.2  F) 36.9  C (98.5  F) 36.4  C (97.6  F)   SpO2:            Electronically Signed By: DESIRAE WELLS MD  September 10, 2018  5:48 AM

## 2018-09-10 NOTE — LACTATION NOTE
RN visited pt for lactation support. Pt reports infant is breastfeeding fairly well, using nipple shield on left. Pt pumping after breastfeeding and getting drops of colostrum. Infant started on phototherapy. Will assist with next feeding.

## 2018-09-10 NOTE — PLAN OF CARE
Vss, afebrile.  Taking pain meds on schedule.  Fundus firm, scant lochia. Working on breastfeeding.  Will continue to monitor and assess.

## 2018-09-10 NOTE — PLAN OF CARE
Problem: Patient Care Overview  Goal: Plan of Care/Patient Progress Review  Outcome: No Change  VSS.  Pain controlled with Tylenol, Ibuprofen and 5 mg of Oxycodone.  Up independently in room. Showered.  Working on breastfeeding  and  cares. On pathway. Instructed pt to pump after feedings, due to baby's high bilirubin and orders per MD. Continue to monitor and notify MD as needed.

## 2018-09-10 NOTE — LACTATION NOTE
This note was copied from a baby's chart.  Infant latched well on left breast but has difficulty with right side. Demonstrated technique to sandwich breast and assist with latch on right side. Infant able to latch and take a few sucks. Pt has used nipple shield in the past. RN encouraged pt to attempt to latch without but can use shield as needed.

## 2018-09-10 NOTE — PROGRESS NOTES
"OB Post-partum Note  PPD# 1    S:  Patient doing well.  Pain controlled.  Voiding.  Bleeding light.  Breast feeding.    O:  /60  Pulse 75  Temp 97.8  F (36.6  C)  Resp 16  Ht 1.626 m (5' 4\")  Wt 78.9 kg (174 lb)  SpO2 100%  Breastfeeding  BMI 29.87 kg/m2  Gen- A&O, NAD  Abd- Non-tender, fundus firm at umbilicus  Ext- non-tender, neg edema    Hemoglobin   Date Value Ref Range Status   2018 9.6 (L) 11.7 - 15.7 g/dL Final     A+  Rubella Immune    A/P: 33 year old  PPD# 1 s/p     1.  Routine post-partum cares.  2.  Anticipate d/c home on PPD# 2.    Selam Church CNM  9/10/2018  8:33 AM  "

## 2018-09-11 VITALS
HEIGHT: 64 IN | TEMPERATURE: 98.1 F | SYSTOLIC BLOOD PRESSURE: 111 MMHG | BODY MASS INDEX: 29.71 KG/M2 | RESPIRATION RATE: 16 BRPM | OXYGEN SATURATION: 100 % | DIASTOLIC BLOOD PRESSURE: 67 MMHG | WEIGHT: 174 LBS | HEART RATE: 83 BPM

## 2018-09-11 LAB
COPATH REPORT: NORMAL
HGB BLD-MCNC: 8 G/DL (ref 11.7–15.7)

## 2018-09-11 PROCEDURE — 85018 HEMOGLOBIN: CPT | Performed by: ADVANCED PRACTICE MIDWIFE

## 2018-09-11 PROCEDURE — 36415 COLL VENOUS BLD VENIPUNCTURE: CPT | Performed by: ADVANCED PRACTICE MIDWIFE

## 2018-09-11 PROCEDURE — 25000132 ZZH RX MED GY IP 250 OP 250 PS 637: Performed by: REGISTERED NURSE

## 2018-09-11 RX ORDER — ACETAMINOPHEN 325 MG/1
650 TABLET ORAL EVERY 6 HOURS PRN
Qty: 100 TABLET | COMMUNITY
Start: 2018-09-11

## 2018-09-11 RX ORDER — FERROUS SULFATE 325(65) MG
325 TABLET ORAL 2 TIMES DAILY
Qty: 30 TABLET | Refills: 2 | COMMUNITY
Start: 2018-09-11

## 2018-09-11 RX ORDER — IBUPROFEN 600 MG/1
600 TABLET, FILM COATED ORAL EVERY 6 HOURS PRN
Status: ON HOLD | COMMUNITY
Start: 2018-09-11 | End: 2020-11-11

## 2018-09-11 RX ORDER — OXYCODONE HYDROCHLORIDE 5 MG/1
5 TABLET ORAL EVERY 6 HOURS PRN
Qty: 12 TABLET | Refills: 0 | Status: ON HOLD | OUTPATIENT
Start: 2018-09-11 | End: 2020-11-11

## 2018-09-11 RX ORDER — AMOXICILLIN 250 MG
1 CAPSULE ORAL 2 TIMES DAILY PRN
Qty: 100 TABLET | Status: ON HOLD | COMMUNITY
Start: 2018-09-11 | End: 2020-11-11

## 2018-09-11 RX ADMIN — OXYCODONE HYDROCHLORIDE 5 MG: 5 TABLET ORAL at 12:00

## 2018-09-11 RX ADMIN — OXYCODONE HYDROCHLORIDE 5 MG: 5 TABLET ORAL at 03:14

## 2018-09-11 RX ADMIN — IBUPROFEN 800 MG: 400 TABLET ORAL at 01:09

## 2018-09-11 RX ADMIN — OXYCODONE HYDROCHLORIDE 5 MG: 5 TABLET ORAL at 07:18

## 2018-09-11 RX ADMIN — IBUPROFEN 800 MG: 400 TABLET ORAL at 07:18

## 2018-09-11 RX ADMIN — ACETAMINOPHEN 975 MG: 325 TABLET, FILM COATED ORAL at 07:18

## 2018-09-11 RX ADMIN — SENNOSIDES AND DOCUSATE SODIUM 2 TABLET: 8.6; 5 TABLET ORAL at 09:36

## 2018-09-11 RX ADMIN — ACETAMINOPHEN 975 MG: 325 TABLET, FILM COATED ORAL at 01:09

## 2018-09-11 RX ADMIN — ACETAMINOPHEN 650 MG: 325 TABLET, FILM COATED ORAL at 14:18

## 2018-09-11 RX ADMIN — IBUPROFEN 800 MG: 400 TABLET ORAL at 14:18

## 2018-09-11 ASSESSMENT — ACTIVITIES OF DAILY LIVING (ADL)
COGNITION: 0 - NO COGNITION ISSUES REPORTED
TRANSFERRING: 0-->INDEPENDENT
RETIRED_COMMUNICATION: 0-->UNDERSTANDS/COMMUNICATES WITHOUT DIFFICULTY
SWALLOWING: 0-->SWALLOWS FOODS/LIQUIDS WITHOUT DIFFICULTY
DRESS: 0-->INDEPENDENT
FALL_HISTORY_WITHIN_LAST_SIX_MONTHS: NO
TOILETING: 0-->INDEPENDENT
BATHING: 0-->INDEPENDENT
AMBULATION: 0-->INDEPENDENT
RETIRED_EATING: 0-->INDEPENDENT

## 2018-09-11 NOTE — PLAN OF CARE
Problem: Patient Care Overview  Goal: Plan of Care/Patient Progress Review  Outcome: Improving  Vital signs stable. Fundus firm, U/1, bleeding scant. Pain well controlled with Tylenol, ibuprofen, oxycodone.  Up independently in room.  Working on breastfeeding  and  cares. Continue to monitor and notify MD as needed.

## 2018-09-11 NOTE — LACTATION NOTE
Routine visit with RON Barton and baby girl.  Getting ready for discharge. Baby latching on well without shield on the right and the left baby is able to latch without the shield.  Mother pumping after each feeding.  Has a pump at home.   Plan: Watch for feeding cues and feed every 2-3 hours and/or on demand. Continue to use feeding log to track intake and appropriate voids and stools. Take feeding log to first follow up appointment or weight check. Encourage skin to skin to promote frequent feedings, thermoregulation and bonding. Follow-up with healthcare provider or lactation consultant for questions or concerns.    No further questions at this time. Yoana Pennington BSN, RN, PHN, RNC-MNN, IBCLC

## 2018-09-11 NOTE — PLAN OF CARE
Problem: Patient Care Overview  Goal: Plan of Care/Patient Progress Review  Outcome: No Change  Working on breastfeeding.  Watched DVD's.  Pain managed with pain meds.   present and supportive at bedside.  Will continue to monitor and assess.

## 2018-09-11 NOTE — PLAN OF CARE
Patient up independently, voiding without difficulty, taking tylenol, ibuprofen, and oxycodone for pain with relief. Ready for discharge. Discharge instructions reviewed with patient, patient verbalized understanding of self and infant care and follow-up needs.

## 2019-09-22 ENCOUNTER — NURSE TRIAGE (OUTPATIENT)
Dept: NURSING | Facility: CLINIC | Age: 34
End: 2019-09-22

## 2019-09-22 NOTE — TELEPHONE ENCOUNTER
"Patient states one hour ago was sitting on the floor playing with her 1 year old and she stood up. States then \"fainted and passed out.\"   States \"I don't remember falling.\"  Reports was \"unconscious for a couple minutes and woke up to her dog licking her face and 1 year old on floor next to her.    Currently reports bruised chin and bloody lip. Right eye red and tender to touch.  States \"I don't think I hit my head. I think I fell on my face.\"  Alert and oriented x 3.   Describes feeling \"some weakness and I'm shaky realizing what happened.\"  Denies history of cardiac problems.    Protocol-  Fainting- Adult  Care advice reviewed.   Disposition-  Go to ED now.  Caller states understanding of the recommended disposition.   States she is closest to [facility]. Has transportation.   This RN advised Patient to go to ED for evaluation.  Advised to call back if further questions or concerns.    Reason for Disposition    Any head or face injury    Additional Information    Negative: Still unconscious    Negative: Difficult to awaken or acting confused (e.g., disoriented, slurred speech)    Negative: Shock suspected (e.g., cold/pale/clammy skin, too weak to stand, low BP, rapid pulse)    Negative: Difficulty breathing    Negative: Bluish (or gray) lips or face now    Negative: Chest pain    Negative: Extra heart beats or heart is beating fast  (i.e.,\"palpitations\")    Negative: Bleeding (e.g., vomiting blood, rectal bleeding or tarry stools, severe vaginal bleeding)(Exception: fainted from sight of small amount of blood; small cut or abrasion)    Negative: Fainted suddenly after medicine, allergic food or bee sting    Negative: Age > 50 years (Exception: occurred > 1 hour ago AND now feels completely fine)    Negative: History of heart problems (e.g., congestive heart failure, heart attack)    Negative: [1] Fainted > 15 minutes ago AND [2] still feels too weak or dizzy to stand    Negative: Sounds like a life-threatening " emergency to the triager    Negative: [1] Fainted > 15 minutes ago AND [2] still looks pale (pale skin, pallor)    Negative: [1] Fainted > 15 minutes ago AND [2] still feels weak or dizzy    Negative: Occurred during exercise    Protocols used: [id number]-Q-PE

## 2020-04-27 LAB
HBV SURFACE AG SERPL QL IA: NEGATIVE
HIV 1+2 AB+HIV1 P24 AG SERPL QL IA: NEGATIVE
RUBELLA ANTIBODY IGG QUANTITATIVE: NORMAL IU/ML

## 2020-10-12 LAB — GROUP B STREP PCR: NEGATIVE

## 2020-10-27 ENCOUNTER — HOSPITAL ENCOUNTER (OUTPATIENT)
Facility: CLINIC | Age: 35
End: 2020-10-27
Admitting: OBSTETRICS & GYNECOLOGY
Payer: COMMERCIAL

## 2020-11-09 ENCOUNTER — MEDICAL CORRESPONDENCE (OUTPATIENT)
Dept: HEALTH INFORMATION MANAGEMENT | Facility: CLINIC | Age: 35
End: 2020-11-09

## 2020-11-10 ENCOUNTER — HOSPITAL ENCOUNTER (OUTPATIENT)
Dept: LAB | Facility: CLINIC | Age: 35
Discharge: HOME | End: 2020-11-10
Attending: OBSTETRICS & GYNECOLOGY | Admitting: OBSTETRICS & GYNECOLOGY
Payer: COMMERCIAL

## 2020-11-10 ENCOUNTER — HOSPITAL ENCOUNTER (OUTPATIENT)
Facility: CLINIC | Age: 35
Discharge: HOME | End: 2020-11-10
Attending: OBSTETRICS & GYNECOLOGY | Admitting: OBSTETRICS & GYNECOLOGY
Payer: COMMERCIAL

## 2020-11-10 DIAGNOSIS — Z01.818 PREOPERATIVE EXAMINATION: Primary | ICD-10-CM

## 2020-11-10 LAB
ABO + RH BLD: NORMAL
ABO + RH BLD: NORMAL
BLD GP AB INVEST PLASRBC-IMP: NORMAL
BLOOD BANK CMNT PATIENT-IMP: NORMAL
BLOOD BANK CMNT PATIENT-IMP: NORMAL
HGB BLD-MCNC: 12.7 G/DL (ref 11.7–15.7)
LABORATORY COMMENT REPORT: NORMAL
SARS-COV-2 RNA SPEC QL NAA+PROBE: NEGATIVE
SARS-COV-2 RNA SPEC QL NAA+PROBE: NORMAL
SPECIMEN SOURCE: NORMAL
SPECIMEN SOURCE: NORMAL
T PALLIDUM AB SER QL: NONREACTIVE

## 2020-11-10 PROCEDURE — U0003 INFECTIOUS AGENT DETECTION BY NUCLEIC ACID (DNA OR RNA); SEVERE ACUTE RESPIRATORY SYNDROME CORONAVIRUS 2 (SARS-COV-2) (CORONAVIRUS DISEASE [COVID-19]), AMPLIFIED PROBE TECHNIQUE, MAKING USE OF HIGH THROUGHPUT TECHNOLOGIES AS DESCRIBED BY CMS-2020-01-R: HCPCS | Performed by: OBSTETRICS & GYNECOLOGY

## 2020-11-10 PROCEDURE — 86900 BLOOD TYPING SEROLOGIC ABO: CPT | Performed by: OBSTETRICS & GYNECOLOGY

## 2020-11-10 PROCEDURE — 86922 COMPATIBILITY TEST ANTIGLOB: CPT | Performed by: OBSTETRICS & GYNECOLOGY

## 2020-11-10 PROCEDURE — 86870 RBC ANTIBODY IDENTIFICATION: CPT | Performed by: OBSTETRICS & GYNECOLOGY

## 2020-11-10 PROCEDURE — 86780 TREPONEMA PALLIDUM: CPT | Performed by: OBSTETRICS & GYNECOLOGY

## 2020-11-10 PROCEDURE — 86905 BLOOD TYPING RBC ANTIGENS: CPT | Performed by: OBSTETRICS & GYNECOLOGY

## 2020-11-10 PROCEDURE — 86902 BLOOD TYPE ANTIGEN DONOR EA: CPT | Performed by: OBSTETRICS & GYNECOLOGY

## 2020-11-10 PROCEDURE — 86850 RBC ANTIBODY SCREEN: CPT | Performed by: OBSTETRICS & GYNECOLOGY

## 2020-11-10 PROCEDURE — 85018 HEMOGLOBIN: CPT | Performed by: OBSTETRICS & GYNECOLOGY

## 2020-11-10 PROCEDURE — C9803 HOPD COVID-19 SPEC COLLECT: HCPCS

## 2020-11-10 PROCEDURE — 86901 BLOOD TYPING SEROLOGIC RH(D): CPT | Performed by: OBSTETRICS & GYNECOLOGY

## 2020-11-11 ENCOUNTER — ANESTHESIA EVENT (OUTPATIENT)
Dept: OBGYN | Facility: CLINIC | Age: 35
End: 2020-11-11
Payer: COMMERCIAL

## 2020-11-11 ENCOUNTER — ANESTHESIA (OUTPATIENT)
Dept: OBGYN | Facility: CLINIC | Age: 35
End: 2020-11-11
Payer: COMMERCIAL

## 2020-11-11 ENCOUNTER — HOSPITAL ENCOUNTER (INPATIENT)
Facility: CLINIC | Age: 35
LOS: 2 days | Discharge: HOME | End: 2020-11-13
Attending: OBSTETRICS & GYNECOLOGY | Admitting: OBSTETRICS & GYNECOLOGY
Payer: COMMERCIAL

## 2020-11-11 LAB
ABO + RH BLD: ABNORMAL
ABO + RH BLD: ABNORMAL
BLD GP AB SCN SERPL QL: ABNORMAL
BLD PROD TYP BPU: ABNORMAL
BLOOD BANK CMNT PATIENT-IMP: ABNORMAL
NUM BPU REQUESTED: 2
SPECIMEN EXP DATE BLD: ABNORMAL

## 2020-11-11 PROCEDURE — 0DQR0ZZ REPAIR ANAL SPHINCTER, OPEN APPROACH: ICD-10-PCS | Performed by: OBSTETRICS & GYNECOLOGY

## 2020-11-11 PROCEDURE — 722N000001 HC LABOR CARE VAGINAL DELIVERY SINGLE

## 2020-11-11 PROCEDURE — 258N000003 HC RX IP 258 OP 636: Performed by: OBSTETRICS & GYNECOLOGY

## 2020-11-11 PROCEDURE — 250N000013 HC RX MED GY IP 250 OP 250 PS 637: Performed by: OBSTETRICS & GYNECOLOGY

## 2020-11-11 PROCEDURE — 370N000003 HC ANESTHESIA WARD SERVICE

## 2020-11-11 PROCEDURE — 120N000001 HC R&B MED SURG/OB

## 2020-11-11 PROCEDURE — 999N000011 HC STATISTIC ANESTHESIA CASE

## 2020-11-11 PROCEDURE — 250N000011 HC RX IP 250 OP 636: Performed by: ANESTHESIOLOGY

## 2020-11-11 PROCEDURE — 10907ZC DRAINAGE OF AMNIOTIC FLUID, THERAPEUTIC FROM PRODUCTS OF CONCEPTION, VIA NATURAL OR ARTIFICIAL OPENING: ICD-10-PCS | Performed by: OBSTETRICS & GYNECOLOGY

## 2020-11-11 PROCEDURE — 250N000009 HC RX 250: Performed by: OBSTETRICS & GYNECOLOGY

## 2020-11-11 RX ORDER — METHYLERGONOVINE MALEATE 0.2 MG/ML
200 INJECTION INTRAVENOUS
Status: DISCONTINUED | OUTPATIENT
Start: 2020-11-11 | End: 2020-11-13 | Stop reason: HOSPADM

## 2020-11-11 RX ORDER — HYDROCORTISONE 2.5 %
CREAM (GRAM) TOPICAL 3 TIMES DAILY PRN
Status: DISCONTINUED | OUTPATIENT
Start: 2020-11-11 | End: 2020-11-13 | Stop reason: HOSPADM

## 2020-11-11 RX ORDER — IBUPROFEN 800 MG/1
800 TABLET, FILM COATED ORAL
Status: COMPLETED | OUTPATIENT
Start: 2020-11-11 | End: 2020-11-11

## 2020-11-11 RX ORDER — AMOXICILLIN 250 MG
2 CAPSULE ORAL 2 TIMES DAILY
Status: DISCONTINUED | OUTPATIENT
Start: 2020-11-11 | End: 2020-11-13 | Stop reason: HOSPADM

## 2020-11-11 RX ORDER — OXYCODONE AND ACETAMINOPHEN 5; 325 MG/1; MG/1
1 TABLET ORAL
Status: DISCONTINUED | OUTPATIENT
Start: 2020-11-11 | End: 2020-11-11

## 2020-11-11 RX ORDER — OXYTOCIN 10 [USP'U]/ML
10 INJECTION, SOLUTION INTRAMUSCULAR; INTRAVENOUS
Status: DISCONTINUED | OUTPATIENT
Start: 2020-11-11 | End: 2020-11-11

## 2020-11-11 RX ORDER — METHYLERGONOVINE MALEATE 0.2 MG/ML
200 INJECTION INTRAVENOUS
Status: DISCONTINUED | OUTPATIENT
Start: 2020-11-11 | End: 2020-11-11

## 2020-11-11 RX ORDER — BISACODYL 10 MG
10 SUPPOSITORY, RECTAL RECTAL DAILY PRN
Status: DISCONTINUED | OUTPATIENT
Start: 2020-11-13 | End: 2020-11-13 | Stop reason: HOSPADM

## 2020-11-11 RX ORDER — OXYTOCIN/0.9 % SODIUM CHLORIDE 30/500 ML
340 PLASTIC BAG, INJECTION (ML) INTRAVENOUS CONTINUOUS PRN
Status: DISCONTINUED | OUTPATIENT
Start: 2020-11-11 | End: 2020-11-13 | Stop reason: HOSPADM

## 2020-11-11 RX ORDER — OXYTOCIN/0.9 % SODIUM CHLORIDE 30/500 ML
100 PLASTIC BAG, INJECTION (ML) INTRAVENOUS CONTINUOUS
Status: DISCONTINUED | OUTPATIENT
Start: 2020-11-11 | End: 2020-11-13 | Stop reason: HOSPADM

## 2020-11-11 RX ORDER — MISOPROSTOL 200 UG/1
800 TABLET ORAL ONCE
Status: COMPLETED | OUTPATIENT
Start: 2020-11-11 | End: 2020-11-11

## 2020-11-11 RX ORDER — IBUPROFEN 800 MG/1
800 TABLET, FILM COATED ORAL EVERY 6 HOURS PRN
Status: DISCONTINUED | OUTPATIENT
Start: 2020-11-11 | End: 2020-11-13 | Stop reason: HOSPADM

## 2020-11-11 RX ORDER — MISOPROSTOL 200 UG/1
TABLET ORAL
Status: DISCONTINUED
Start: 2020-11-11 | End: 2020-11-11 | Stop reason: HOSPADM

## 2020-11-11 RX ORDER — OXYTOCIN/0.9 % SODIUM CHLORIDE 30/500 ML
1-24 PLASTIC BAG, INJECTION (ML) INTRAVENOUS CONTINUOUS
Status: DISCONTINUED | OUTPATIENT
Start: 2020-11-11 | End: 2020-11-11

## 2020-11-11 RX ORDER — CARBOPROST TROMETHAMINE 250 UG/ML
250 INJECTION, SOLUTION INTRAMUSCULAR
Status: DISCONTINUED | OUTPATIENT
Start: 2020-11-11 | End: 2020-11-11

## 2020-11-11 RX ORDER — CARBOPROST TROMETHAMINE 250 UG/ML
250 INJECTION, SOLUTION INTRAMUSCULAR
Status: DISCONTINUED | OUTPATIENT
Start: 2020-11-11 | End: 2020-11-13 | Stop reason: HOSPADM

## 2020-11-11 RX ORDER — NALOXONE HYDROCHLORIDE 0.4 MG/ML
.1-.4 INJECTION, SOLUTION INTRAMUSCULAR; INTRAVENOUS; SUBCUTANEOUS
Status: DISCONTINUED | OUTPATIENT
Start: 2020-11-11 | End: 2020-11-11

## 2020-11-11 RX ORDER — NALOXONE HYDROCHLORIDE 0.4 MG/ML
.1-.4 INJECTION, SOLUTION INTRAMUSCULAR; INTRAVENOUS; SUBCUTANEOUS
Status: DISCONTINUED | OUTPATIENT
Start: 2020-11-11 | End: 2020-11-13 | Stop reason: HOSPADM

## 2020-11-11 RX ORDER — AMOXICILLIN 250 MG
1 CAPSULE ORAL 2 TIMES DAILY
Status: DISCONTINUED | OUTPATIENT
Start: 2020-11-11 | End: 2020-11-13 | Stop reason: HOSPADM

## 2020-11-11 RX ORDER — TRANEXAMIC ACID 10 MG/ML
1 INJECTION, SOLUTION INTRAVENOUS EVERY 30 MIN PRN
Status: DISCONTINUED | OUTPATIENT
Start: 2020-11-11 | End: 2020-11-13 | Stop reason: HOSPADM

## 2020-11-11 RX ORDER — OXYTOCIN 10 [USP'U]/ML
10 INJECTION, SOLUTION INTRAMUSCULAR; INTRAVENOUS
Status: DISCONTINUED | OUTPATIENT
Start: 2020-11-11 | End: 2020-11-13 | Stop reason: HOSPADM

## 2020-11-11 RX ORDER — MODIFIED LANOLIN
OINTMENT (GRAM) TOPICAL
Status: DISCONTINUED | OUTPATIENT
Start: 2020-11-11 | End: 2020-11-13 | Stop reason: HOSPADM

## 2020-11-11 RX ORDER — ACETAMINOPHEN 325 MG/1
650 TABLET ORAL EVERY 4 HOURS PRN
Status: DISCONTINUED | OUTPATIENT
Start: 2020-11-11 | End: 2020-11-11

## 2020-11-11 RX ORDER — TRANEXAMIC ACID 10 MG/ML
1 INJECTION, SOLUTION INTRAVENOUS EVERY 30 MIN PRN
Status: DISCONTINUED | OUTPATIENT
Start: 2020-11-11 | End: 2020-11-11

## 2020-11-11 RX ORDER — ACETAMINOPHEN 325 MG/1
650 TABLET ORAL EVERY 4 HOURS PRN
Status: DISCONTINUED | OUTPATIENT
Start: 2020-11-11 | End: 2020-11-13 | Stop reason: HOSPADM

## 2020-11-11 RX ORDER — OXYTOCIN/0.9 % SODIUM CHLORIDE 30/500 ML
100-340 PLASTIC BAG, INJECTION (ML) INTRAVENOUS CONTINUOUS PRN
Status: DISCONTINUED | OUTPATIENT
Start: 2020-11-11 | End: 2020-11-11

## 2020-11-11 RX ORDER — FENTANYL CITRATE 50 UG/ML
50-100 INJECTION, SOLUTION INTRAMUSCULAR; INTRAVENOUS
Status: DISCONTINUED | OUTPATIENT
Start: 2020-11-11 | End: 2020-11-11

## 2020-11-11 RX ORDER — ONDANSETRON 2 MG/ML
4 INJECTION INTRAMUSCULAR; INTRAVENOUS EVERY 6 HOURS PRN
Status: DISCONTINUED | OUTPATIENT
Start: 2020-11-11 | End: 2020-11-11

## 2020-11-11 RX ORDER — POLYETHYLENE GLYCOL 3350 17 G/17G
17 POWDER, FOR SOLUTION ORAL DAILY
Status: DISCONTINUED | OUTPATIENT
Start: 2020-11-11 | End: 2020-11-13 | Stop reason: HOSPADM

## 2020-11-11 RX ORDER — SODIUM CHLORIDE, SODIUM LACTATE, POTASSIUM CHLORIDE, CALCIUM CHLORIDE 600; 310; 30; 20 MG/100ML; MG/100ML; MG/100ML; MG/100ML
INJECTION, SOLUTION INTRAVENOUS CONTINUOUS
Status: DISCONTINUED | OUTPATIENT
Start: 2020-11-11 | End: 2020-11-11

## 2020-11-11 RX ORDER — EPHEDRINE SULFATE 50 MG/ML
5 INJECTION, SOLUTION INTRAMUSCULAR; INTRAVENOUS; SUBCUTANEOUS
Status: DISCONTINUED | OUTPATIENT
Start: 2020-11-11 | End: 2020-11-11

## 2020-11-11 RX ORDER — NALBUPHINE HYDROCHLORIDE 20 MG/ML
2.5-5 INJECTION, SOLUTION INTRAMUSCULAR; INTRAVENOUS; SUBCUTANEOUS EVERY 6 HOURS PRN
Status: DISCONTINUED | OUTPATIENT
Start: 2020-11-11 | End: 2020-11-11

## 2020-11-11 RX ADMIN — Medication 10 ML/HR: at 10:48

## 2020-11-11 RX ADMIN — IBUPROFEN 800 MG: 800 TABLET ORAL at 21:18

## 2020-11-11 RX ADMIN — DOCUSATE SODIUM 50 MG AND SENNOSIDES 8.6 MG 1 TABLET: 8.6; 5 TABLET, FILM COATED ORAL at 21:18

## 2020-11-11 RX ADMIN — SODIUM CHLORIDE, POTASSIUM CHLORIDE, SODIUM LACTATE AND CALCIUM CHLORIDE: 600; 310; 30; 20 INJECTION, SOLUTION INTRAVENOUS at 10:57

## 2020-11-11 RX ADMIN — Medication 2 MILLI-UNITS/MIN: at 08:46

## 2020-11-11 RX ADMIN — IBUPROFEN 800 MG: 800 TABLET ORAL at 14:14

## 2020-11-11 RX ADMIN — MISOPROSTOL 800 MCG: 200 TABLET ORAL at 13:50

## 2020-11-11 RX ADMIN — SODIUM CHLORIDE, POTASSIUM CHLORIDE, SODIUM LACTATE AND CALCIUM CHLORIDE: 600; 310; 30; 20 INJECTION, SOLUTION INTRAVENOUS at 08:10

## 2020-11-11 ASSESSMENT — ACTIVITIES OF DAILY LIVING (ADL)
TOILETING_ISSUES: NO
FALL_HISTORY_WITHIN_LAST_SIX_MONTHS: NO

## 2020-11-11 NOTE — PROGRESS NOTES
Data: Upon transfer patient unable to ambulate and not able to void. Vital signs stable.    Action: Waited 45 minutes then tried to stand. Pt able to stand and was able to void. Encouraged to void every 2-3 hours before feeding baby.     Review: Pt remains stable. Call light with in reach.    Patients mobililty level scored using the bedside mobility assistance tool (BMAT). Patient is at a mobility level test number: 4. Mobility equipment used: none required. Required assist of 0 staff members. Further use of BMAT scoring not required.

## 2020-11-11 NOTE — PROVIDER NOTIFICATION
11/11/20 1034   Provider Notification   Provider Name/Title Dr. David   Method of Notification At Bedside   Dr. David at bedside for epidural per pt request for pain management in labor.

## 2020-11-11 NOTE — H&P
OB Brief Admit H&P    No significant change in general health status based on examination of the patient, review of Nursing Admission Database and prenatal record.    Pt is a 35 year old  @ 40w2d who presented to L&D for IOL due to suspected cholestasis and favorable cervix at term. Patient feeling occasional contractions.    Patient's prenatal course has been complicated by Anti C and e antibodies, history of depression, history of PPH, suspected cholestasis.    Prenatal Labs:    Blood type A pos - Antibody e Negative, C negative  Rubella immune    GBS neg    EFW: 8.5 lbs    /58   Temp 97.9  F (36.6  C) (Oral)   Resp 18   EFM:  Baseline 135 bpm, moderate variability, accels present, no decels  Sunburst: irregular  SVE: 3-4/90/-2  Membranes:  AROM, clear fluid    Assessment:  35 year old  @ 40w2d admitted for IOL due to suspected cholestasis with favorable cervix at term.     Labor: s/p AROM  - plan for pitocin augmentation  - Pain: planning epidural  - History of PPH, have PPH medications in room at time of delivery  - Anticipated     FWB:  Category I, reactive  - continuous fetal monitoring  - EFW 8.5 lbs    Prenatal:  - GBS negative  - Suspected cholestasis with diffuse itching,   - Anti C and anti e antibodies, type and screen completed  - COVID negative  - History of depression, consider 2 week postpartum mood check    Cely Crystal MD  2020  8:45 AM

## 2020-11-11 NOTE — PROVIDER NOTIFICATION
11/11/20 1251   Provider Notification   Provider Name/Title Dr. Crystal   Method of Notification At Bedside   Request Attend Delivery   Dr. Kevin QUICK at bedside to attend delivery.

## 2020-11-11 NOTE — ANESTHESIA PROCEDURE NOTES
Procedure note :      Staff -   Performed By: Anesthesiologist         Assessment/Narrative  .  .  . Comments:  Pre-Procedure  Performed by Donte David MD  Location: OB.      PreAnesthestic Checklist: patient identified, IV checked, risks and benefits discussed, informed consent obtained, monitors and equipment checked, pre-op evaluation and at physician/surgeon's request.    Timeout   Correct Patient: Yes  Correct Procedure: Epidural catheter placement  Correct Site: Yes   Correct Position: Yes    Procedure Documentation  Procedure:   Epidural catheter block for Labor    Patient currently in labor and she and OBMD request a labor epidural to control her labor pains. Patient was interviewed and examined. Procedure and risks including but not limited to bleeding, infection, nerve injury, paralysis, PDPH, and inadequate block requiring intervention discussed with patient. Questions answered. This epidural is to be placed in anticipation of vaginal delivery.  She consents to the epidural procedure.  Time-out was performed.  I or my partners remain immediately available for management of any issues or complications and will monitor at appropriate intervals.  Procedure: Patient sitting. Betadine prep x 3. Sterile drape applied.  Mask and gloves used.  Lidocaine 1%  local infiltration at L 3-4.  17 G. Tuohy needle at L3-4 by loss of resistance into epidural space.  No CSF, paresthesia or blood. 1.5 % Lidocaine with 1:200,000 Epinephrine 5cc test dose. Epidural catheter inserted w/o resistance to 5 cm in epidural space. Then 0.125% bupivicaine with fentanyl 2 mcg/ml 12 cc with NS 5 cc.  Aspiration negative for blood and CSF.   Negative for neuro change, paresthesia or symptoms of intravascular injection or intrathecal injection.  Infusion orders written and infusion of 0.125% bupivicaine with fentanyl 2 mcg/ml at 10cc per hour started.    Donte David MD

## 2020-11-11 NOTE — ANESTHESIA PREPROCEDURE EVALUATION
Anesthesia Pre-Procedure Evaluation    Patient: Oralia Wright   MRN: [id number] : 1985          Preoperative Diagnosis: * No pre-op diagnosis entered *    * No procedures listed *    Past Medical History:   Diagnosis Date     Acne      Attention deficit      Depression     stable at present     Depressive disorder      Uncomplicated asthma     exercise induced     Past Surgical History:   Procedure Laterality Date     ENT SURGERY      wisdom teeth removed     SEPTOPLASTY, TURBINOPLASTY, COMBINED N/A 2014    Procedure: COMBINED SEPTOPLASTY, TURBINOPLASTY;  Surgeon: Melissa Guzman MD;  Location: [facility]     TONSILLECTOMY, ADENOIDECTOMY, COMBINED Bilateral 2014    Procedure: COMBINED TONSILLECTOMY, ADENOIDECTOMY;  Surgeon: Melissa Guzman MD;  Location: [facility]     Anesthesia Evaluation       history and physical reviewed .             ROS/MED HX    ENT/Pulmonary:       Neurologic:       Cardiovascular:  - neg cardiovascular ROS       METS/Exercise Tolerance:     Hematologic:         Musculoskeletal:         GI/Hepatic:         Renal/Genitourinary:         Endo:         Psychiatric:         Infectious Disease:         Malignancy:         Other:                     neg OB ROS            Physical Exam      Airway   Mallampati: II  TM distance: > 3 FB  Neck ROM: full    Dental     Cardiovascular       Pulmonary             Lab Results   Component Value Date    WBC 12.2 (H) 2005    HGB 12.7 11/10/2020    HCT 36.3 2005     2005    HCG Negative 2014       Preop Vitals  BP Readings from Last 3 Encounters:   20 96/51   18 111/67   14 107/78    Pulse Readings from Last 3 Encounters:   09/10/18 83   14 89      Resp Readings from Last 3 Encounters:   20 16   18 16   14 18    SpO2 Readings from Last 3 Encounters:   20 99%   18 100%   14 98%      Temp Readings from Last 1 Encounters:   20 97.7  F  "(36.5  C) (Oral)    Ht Readings from Last 1 Encounters:   09/08/18 1.626 m (5' 4\")      Wt Readings from Last 1 Encounters:   09/08/18 78.9 kg (174 lb)    Estimated body mass index is 29.87 kg/m  as calculated from the following:    Height as of 9/8/18: 1.626 m (5' 4\").    Weight as of 9/8/18: 78.9 kg (174 lb).       Anesthesia Plan      History & Physical Review      ASA Status:  2 .             Postoperative Care      Consents  Anesthetic plan, risks, benefits and alternatives discussed with:  Patient..                 Donte David MD                    .  "

## 2020-11-11 NOTE — PROVIDER NOTIFICATION
11/11/20 0837   Provider Notification   Provider Name/Title Dr. Crystal   Method of Notification At Bedside   Updated Dr. Crystal on arrival of pt here for induction for cholestasis. Pt is GBS negative. FHT's cat I tracing. SVE by RN 3.5/75/-2, babb score of 9. Pt plans for epidural.    MD at bedside. SVE by MD 3.5/85/-2. AROM for moderate amount of clear amniotic fluid. POC discussed with pt. Plan to start IV Pitocin per protocol. Pt agreeable with plan.

## 2020-11-11 NOTE — PROVIDER NOTIFICATION
11/11/20 1118   Provider Notification   Provider Name/Title Dr. Crystal   Method of Notification Electronic Page   Text page to Dr. Crystal:  TONG, pt now comfortable with epidural. SVE 5/95/-1. FHT's mostly cat I. LD seen x1. Thanks.

## 2020-11-12 LAB — HGB BLD-MCNC: 11.6 G/DL (ref 11.7–15.7)

## 2020-11-12 PROCEDURE — 120N000001 HC R&B MED SURG/OB

## 2020-11-12 PROCEDURE — 85018 HEMOGLOBIN: CPT | Performed by: OBSTETRICS & GYNECOLOGY

## 2020-11-12 PROCEDURE — 250N000013 HC RX MED GY IP 250 OP 250 PS 637: Performed by: OBSTETRICS & GYNECOLOGY

## 2020-11-12 PROCEDURE — 36415 COLL VENOUS BLD VENIPUNCTURE: CPT | Performed by: OBSTETRICS & GYNECOLOGY

## 2020-11-12 RX ORDER — IBUPROFEN 800 MG/1
800 TABLET, FILM COATED ORAL EVERY 6 HOURS PRN
Qty: 901 TABLET | COMMUNITY
Start: 2020-11-12

## 2020-11-12 RX ORDER — AMOXICILLIN 250 MG
2 CAPSULE ORAL 2 TIMES DAILY PRN
COMMUNITY
Start: 2020-11-12 | End: 2020-11-13

## 2020-11-12 RX ADMIN — IBUPROFEN 800 MG: 800 TABLET ORAL at 16:25

## 2020-11-12 RX ADMIN — DOCUSATE SODIUM 50 MG AND SENNOSIDES 8.6 MG 1 TABLET: 8.6; 5 TABLET, FILM COATED ORAL at 21:32

## 2020-11-12 RX ADMIN — POLYETHYLENE GLYCOL 3350 17 G: 17 POWDER, FOR SOLUTION ORAL at 08:46

## 2020-11-12 RX ADMIN — IBUPROFEN 800 MG: 800 TABLET ORAL at 23:12

## 2020-11-12 RX ADMIN — IBUPROFEN 800 MG: 800 TABLET ORAL at 03:58

## 2020-11-12 RX ADMIN — DOCUSATE SODIUM 50 MG AND SENNOSIDES 8.6 MG 1 TABLET: 8.6; 5 TABLET, FILM COATED ORAL at 08:46

## 2020-11-12 RX ADMIN — IBUPROFEN 800 MG: 800 TABLET ORAL at 10:29

## 2020-11-12 NOTE — PROGRESS NOTES
OB Post-partum Note  PPD# 1    S:  Patient doing well.  Pain controlled.  Voiding.  Bleeding is normal.  Breast feeding. Desires discharge home today    O:  /67   Pulse 72   Temp 98  F (36.7  C) (Oral)   Resp 18   SpO2 99%   Breastfeeding Unknown   Gen- A&O, NAD  Abd- Non-tender, fundus non tender and firm   Ext- non-tender, no calf pain    Hemoglobin   Date Value Ref Range Status   2020 11.6 (L) 11.7 - 15.7 g/dL Final     A +  Rubella Immune    A/P: 35 year old  PPD# 1 s/p     1.  Routine post-partum cares  2.  Analgesia Tylenol/Motrin  3.  Discharge home later today at pt request.No Rx needed  4.  The plan of care was discussed with the patient.  She expressed understanding and agreement  5.  RTC in 6 weeks  6.  Indications to call or return were discussed. Post partum instructions were given      Bri Kong MD  2020  7:24 AM

## 2020-11-12 NOTE — L&D DELIVERY NOTE
OB Delivery Summary    1.  with  IUP @ 40w2d  2.  Pregnancy complications- suspected cholestasis.Total bile acids WNL but pt w/ persistent itching last few weeks of pregnancy. + antibody screen in pregnancy, hx PPH, hx depression  3.  Labor- IOL for suspected cholestasis. AROM on admission and pitocin. Progressed quickly to complete by 12:48 and began pushing at 12:49  4   Analgesia- Epidural  5.  Fetal heart tones-reassuring throughout labor. Cat II while pushing. Deep/prolonged variables down to 80s with good recovery to 120s - 130s and moderate variability.   6.  Labor interventions-none  7.  Membranes-AROM @ 0842 on 20  8.   Infant- viable, vigorous, vertex, EDNA male delivered at 1335 on 2020, Apgars 8 and 9 at one and five minutes.  Weight pending.     Labor admission and management done by Dr. Crystal. Pt was considering being a CNM pt if she had delivered at [facility]. As I was in clinic, I was able to do the delivery but had to leave prior to delivery of placenta. See note by Dr. Crystal for documentation on placenta as well as perineal laceration an QBL.     Suzanne Seth CNM  2020  2:00 PM

## 2020-11-12 NOTE — ANESTHESIA POSTPROCEDURE EVALUATION
Patient: Oralia Wright    * No procedures listed *    Diagnosis:* No pre-op diagnosis entered *  Diagnosis Additional Information: No value filed.    Anesthesia Type:  No value filed.    Note:  Anesthesia Post Evaluation    Patient location during evaluation: Bedside and Floor  Patient participation: Able to fully participate in evaluation  Level of consciousness: awake and alert  Pain management: adequate  Airway patency: patent  Cardiovascular status: acceptable  Respiratory status: acceptable  Hydration status: acceptable  PONV: none     Anesthetic complications: None    Comments: I or my partner was immediately available for management of this patient during epidural analgesia infusion.   Patient post labor epidural catheter, doing well.  She reports good pain relief with epidural catheter.  She denies ongoing sensorimotor block, headache, fever, chills or other complaints.  All questions answered, understanding voiced.  She will have us contacted for any questions or problems.        Last vitals:  Vitals:    11/11/20 1615 11/12/20 0100 11/12/20 0800   BP: 108/64 103/65 105/67   Pulse: 66 79 72   Resp: 18 16 18   Temp: 97.5  F (36.4  C) 97.6  F (36.4  C) 98  F (36.7  C)   SpO2:            Electronically Signed By: Kwadwo Landeros MD  November 12, 2020  10:06 AM

## 2020-11-12 NOTE — LACTATION NOTE
This note was copied from a baby's chart.  LC visit.  Infant has been nursing well. NO concerns noted.  She nursed her last baby until recently and may also nurse her toddler occasionally. LC suggested that if she chooses to comfort nurse her toddler she should wait until this baby has fully finished a feeding on both sides.  She is agreeable. She also reported that her toddler only nursed once breast and refused the right.  LC encouraged her to start her baby on the right with each feeding and then move him to the left since that side has bigger milk volumes and will require less stimulation to produce milk.  He has been latching on the right so far. All questions were answered.

## 2020-11-12 NOTE — L&D DELIVERY NOTE
After delivery of infant ABDELRAHMAN Seth had to step away for patient care. I completed the delivery of the placenta and completed the perineal repair.     The infant was placed on the mother's chest.  Delayed cord clamping was performed.  The cord was then doubly clamped and cut. A cord segment and cord blood were collected and held if needed. The placenta then delivered spontaneously and appeared intact with a 3-vessel cord at 1342.  Pitocin was started and fundal massage was performed.  Perineum was inspected and a partial 3rd degree laceration was noted. This was repaired in the usual fashion with 2-0 and 3-0 vicryl. Quantitative blood loss for the delivery was 252 mL. Given history of postpartum hemorrhage 800 mcg of rectal cytotec was placed for prophylaxis.  A rectal exam was performed that noted rectal sphincter intact.      Both mother and baby tolerated delivery well and there were no complications.    Given 3rd degree laceration recommended bowel regimen of senna and miralax daily.      Cely Crystal MD  11/11/2020  8:12 PM

## 2020-11-13 VITALS
OXYGEN SATURATION: 99 % | SYSTOLIC BLOOD PRESSURE: 106 MMHG | HEART RATE: 79 BPM | TEMPERATURE: 97.8 F | DIASTOLIC BLOOD PRESSURE: 69 MMHG | RESPIRATION RATE: 16 BRPM

## 2020-11-13 PROCEDURE — 250N000013 HC RX MED GY IP 250 OP 250 PS 637: Performed by: OBSTETRICS & GYNECOLOGY

## 2020-11-13 RX ADMIN — IBUPROFEN 800 MG: 800 TABLET ORAL at 05:17

## 2020-11-13 RX ADMIN — POLYETHYLENE GLYCOL 3350 17 G: 17 POWDER, FOR SOLUTION ORAL at 08:56

## 2020-11-13 RX ADMIN — ACETAMINOPHEN 650 MG: 325 TABLET, FILM COATED ORAL at 10:27

## 2020-11-13 RX ADMIN — DOCUSATE SODIUM 50 MG AND SENNOSIDES 8.6 MG 2 TABLET: 8.6; 5 TABLET, FILM COATED ORAL at 08:50

## 2020-11-13 RX ADMIN — IBUPROFEN 800 MG: 800 TABLET ORAL at 17:58

## 2020-11-13 RX ADMIN — IBUPROFEN 800 MG: 800 TABLET ORAL at 11:51

## 2020-11-13 RX ADMIN — ACETAMINOPHEN 650 MG: 325 TABLET, FILM COATED ORAL at 17:58

## 2020-11-13 RX ADMIN — ACETAMINOPHEN 650 MG: 325 TABLET, FILM COATED ORAL at 14:30

## 2020-11-13 NOTE — PLAN OF CARE
Data: Oralia Wright transferred to 443 via wheelchair at 1610. Baby transferred via parent's arms.  Action: Receiving unit notified of transfer: Yes. Patient and family notified of room change. Report given to SAMMI Babb at 1615. Belongings sent to receiving unit. Accompanied by Registered Nurse. Oriented patient to surroundings. Call light within reach. ID bands double-checked with receiving RN.  Response: Patient tolerated transfer and is stable.  
Data: [facility]: 2020  7:38 AM.  Patient admitted for induction for cholestasis. Patient is a .  Prenatal record reviewed. Pregnancy has been uncomplicated.  Gestational Age 40w2d. VSS. Fetal movement present. Patient denies uterine contractions, leaking of vaginal fluid/rupture of membranes, vaginal bleeding, abdominal pain, pelvic pressure, nausea, vomiting, headache, visual disturbances, epigastric or URQ pain, significant edema. Support person is present.   Action: Verbal consent for EFM.  Admission assessment completed. Bill of rights reviewed.  Response: Patient verbalized agreement with plan. Will contact Dr Cely Crystal with update and further orders.    
Data: Vital signs within normal limits. Postpartum checks within normal limits - see flow record. Patient eating and drinking normally. Patient able to empty bladder independently and is up ambulating. No apparent signs of infection.  3rd degree laceration tender. Patient using Ibuprofen, Tucks, and Ice with some relief.  Patient performing self cares and is able to care for infant.  Action: Patient medicated during the shift for pain and cramping. See MAR. Patient reassessed within 1 hour after each medication and pain was improved - patient stated she was comfortable. Patient education done about safe infant sleep, breastfeeding cues and satiety, finger feeding, and bottle feeding with donor milk, jaundice and bilirubin results and phototherapy. Handouts given and mother reassured. See flow record.  Response: Positive attachment behaviors observed with infant. Support person not present.   Plan: Continue to monitor, assess, and prepare for discharge.  Anticipate discharge on 11/13/20.  
Data: Vital signs within normal limits. Postpartum checks within normal limits - see flow record. Patient eating and drinking normally. Patient able to empty bladder independently and is up ambulating. No apparent signs of infection. Patient performing self cares and is able to care for infant.  Action: Patient medicated during the shift for pain and cramping. See MAR. Patient reassessed within 1 hour after each medication and pain was improved - patient stated she was comfortable. Patient education done about breastfeeding and infant jaundice levels. See flow record.  Response: Positive attachment behaviors observed with infant. Support person present.   Plan: Anticipate discharge on 11/13.  Was anticipating 24 hr discharge, however infant is now under phototherapy and will stay the night.  
Patient vital signs stable and meeting expected outcomes.  Breastfeeding well.  Up independently and voiding adequately.  Pain well controlled with ibuprofen.  Able to perform all cares for self and infant.  Bonding well with baby.  Will continue to monitor.      
Vital signs stable.  Pain managed with Tylenol and Ibuprofen as needed. Pt is ambulating well on her own and voiding without difficulty. Pt is concerned about having a BM and has taken Senna and Miralax with no results yet. Pt is breastfeeding, milk is starting to come in.   at bedside and very supportive.  Bonding well with baby. Baby is on bili blanket after being on bili lights, hoping for baby to discharge today as well.    Discharge instructions reviewed with pt.  All questions and concerns addressed.  Pt verbalized understanding.  Baby cleared for discharge home.  Pt discharged home with all of her belongings in stable condition accompanied by her  via ambulation at 1820.      
PAIN SCALE 9 OF 10.

## 2020-11-13 NOTE — LACTATION NOTE
This note was copied from a baby's chart.  Lactation visit. This is Oralia's second child, she recently finished nursing her two year old within the last few months. She feels this infant is nursing well given the need for phototherapy, and she is pumping 20mL post feeding. Infant is bottle feeding her breastmilk after nursing, tolerating well. Writer discussed strategies for weaning from pumping as infant's bilirubin stabilizes, and encouraged her to reach out to her pediatric clinic with any concerns.

## 2020-11-13 NOTE — PROGRESS NOTES
Post-partum Note      S: Patient is doing well today.  Pain is controlled with PO medications.  Tolerating regular diet without nausea or vomiting.  Ambulating without dizziness.  Urinating without difficulty. Lochia normal.  Breastfeeding. Baby on bili lights, which has been tough.     O:   Patient Vitals for the past 24 hrs:   BP Temp Temp src Pulse Resp   20 2356 106/59 97.9  F (36.6  C) Oral 73 16   205 105/65 97.8  F (36.6  C) Oral 71 16   20 1700 105/69 97.9  F (36.6  C) Oral 70 16     Gen:  Resting comfortably, NAD  Pulm:  Breathing comfortably on room air  Abd:  Soft, appropriately ttp, non-distended.Fundus at 1 cm below umbilicus, firm and non-tender.  Ext:  non-tender, no bilateral LE edema    No intake/output data recorded.    Hgb:   Hemoglobin   Date Value Ref Range Status   2020 11.6 (L) 11.7 - 15.7 g/dL Final       Assessment/Plan:  35 year old  on PPD #2 s/p . Meeting discharge goals.  1. Continue with routine postpartum management  2. Partial 3rd degree laceration: Recommend stool softener (has miralax at home). Discussed sitz baths.  3. Mild anemia related to pregnancy and delivery blood loss, asymptomatic. Iron supplementation not indicated.  4. Rh: Positive  5. Feed: Breastfeeding  Dispo: NM home today. Warning signs reviewed. Follow-up in 6 weeks.    Nydia Núñez MD  [facility]  2020, 8:43 AM

## 2020-11-14 LAB
BLD PROD TYP BPU: NORMAL
BLD PROD TYP BPU: NORMAL
BLD UNIT ID BPU: 0
BLD UNIT ID BPU: 0
BLOOD PRODUCT CODE: NORMAL
BLOOD PRODUCT CODE: NORMAL
BPU ID: NORMAL
BPU ID: NORMAL
TRANSFUSION STATUS PATIENT QL: NORMAL

## 2024-06-19 ENCOUNTER — LAB REQUISITION (OUTPATIENT)
Dept: LAB | Facility: CLINIC | Age: 39
End: 2024-06-19
Payer: COMMERCIAL

## 2024-06-19 DIAGNOSIS — R55 SYNCOPE AND COLLAPSE: ICD-10-CM

## 2024-06-19 LAB
ERYTHROCYTE [DISTWIDTH] IN BLOOD BY AUTOMATED COUNT: 12.7 % (ref 10–15)
HCT VFR BLD AUTO: 38.5 % (ref 35–47)
HGB BLD-MCNC: 12.9 G/DL (ref 11.7–15.7)
MCH RBC QN AUTO: 30.9 PG (ref 26.5–33)
MCHC RBC AUTO-ENTMCNC: 33.5 G/DL (ref 31.5–36.5)
MCV RBC AUTO: 92 FL (ref 78–100)
PLATELET # BLD AUTO: 232 10E3/UL (ref 150–450)
RBC # BLD AUTO: 4.18 10E6/UL (ref 3.8–5.2)
TSH SERPL DL<=0.005 MIU/L-ACNC: 1.94 UIU/ML (ref 0.3–4.2)
WBC # BLD AUTO: 5 10E3/UL (ref 4–11)

## 2024-06-19 PROCEDURE — 84443 ASSAY THYROID STIM HORMONE: CPT | Mod: ORL | Performed by: OBSTETRICS & GYNECOLOGY

## 2024-06-19 PROCEDURE — 80061 LIPID PANEL: CPT | Mod: ORL | Performed by: OBSTETRICS & GYNECOLOGY

## 2024-06-19 PROCEDURE — 85027 COMPLETE CBC AUTOMATED: CPT | Mod: ORL | Performed by: OBSTETRICS & GYNECOLOGY

## 2024-06-19 PROCEDURE — 80053 COMPREHEN METABOLIC PANEL: CPT | Mod: ORL | Performed by: OBSTETRICS & GYNECOLOGY

## 2024-06-20 LAB
ALBUMIN SERPL BCG-MCNC: 4.3 G/DL (ref 3.5–5.2)
ALP SERPL-CCNC: 41 U/L (ref 40–150)
ALT SERPL W P-5'-P-CCNC: 15 U/L (ref 0–50)
ANION GAP SERPL CALCULATED.3IONS-SCNC: 14 MMOL/L (ref 7–15)
AST SERPL W P-5'-P-CCNC: 26 U/L (ref 0–45)
BILIRUB SERPL-MCNC: 0.3 MG/DL
BUN SERPL-MCNC: 11.1 MG/DL (ref 6–20)
CALCIUM SERPL-MCNC: 9.2 MG/DL (ref 8.6–10)
CHLORIDE SERPL-SCNC: 101 MMOL/L (ref 98–107)
CHOLEST SERPL-MCNC: 200 MG/DL
CREAT SERPL-MCNC: 0.73 MG/DL (ref 0.51–0.95)
DEPRECATED HCO3 PLAS-SCNC: 22 MMOL/L (ref 22–29)
EGFRCR SERPLBLD CKD-EPI 2021: >90 ML/MIN/1.73M2
FASTING STATUS PATIENT QL REPORTED: YES
FASTING STATUS PATIENT QL REPORTED: YES
GLUCOSE SERPL-MCNC: 87 MG/DL (ref 70–99)
HDLC SERPL-MCNC: 85 MG/DL
LDLC SERPL CALC-MCNC: 99 MG/DL
NONHDLC SERPL-MCNC: 115 MG/DL
POTASSIUM SERPL-SCNC: 3.9 MMOL/L (ref 3.4–5.3)
PROT SERPL-MCNC: 7.1 G/DL (ref 6.4–8.3)
SODIUM SERPL-SCNC: 137 MMOL/L (ref 135–145)
TRIGL SERPL-MCNC: 79 MG/DL

## 2025-08-01 ENCOUNTER — LAB REQUISITION (OUTPATIENT)
Dept: LAB | Facility: CLINIC | Age: 40
End: 2025-08-01
Payer: COMMERCIAL

## 2025-08-01 DIAGNOSIS — Z12.4 ENCOUNTER FOR SCREENING FOR MALIGNANT NEOPLASM OF CERVIX: ICD-10-CM

## 2025-08-01 PROCEDURE — 88305 TISSUE EXAM BY PATHOLOGIST: CPT | Mod: TC,ORL | Performed by: OBSTETRICS & GYNECOLOGY

## 2025-08-01 PROCEDURE — 87624 HPV HI-RISK TYP POOLED RSLT: CPT | Mod: ORL | Performed by: OBSTETRICS & GYNECOLOGY

## 2025-08-01 PROCEDURE — G0145 SCR C/V CYTO,THINLAYER,RESCR: HCPCS | Mod: ORL | Performed by: OBSTETRICS & GYNECOLOGY

## 2025-08-04 LAB
HPV HR 12 DNA CVX QL NAA+PROBE: NEGATIVE
HPV16 DNA CVX QL NAA+PROBE: NEGATIVE
HPV18 DNA CVX QL NAA+PROBE: NEGATIVE
HUMAN PAPILLOMA VIRUS FINAL DIAGNOSIS: NORMAL

## 2025-08-05 LAB
PATH REPORT.COMMENTS IMP SPEC: NORMAL
PATH REPORT.COMMENTS IMP SPEC: NORMAL
PATH REPORT.FINAL DX SPEC: NORMAL
PATH REPORT.GROSS SPEC: NORMAL
PATH REPORT.MICROSCOPIC SPEC OTHER STN: NORMAL
PATH REPORT.RELEVANT HX SPEC: NORMAL
PHOTO IMAGE: NORMAL

## 2025-08-05 PROCEDURE — 88305 TISSUE EXAM BY PATHOLOGIST: CPT | Mod: 26 | Performed by: PATHOLOGY

## 2025-08-06 LAB
BKR AP ASSOCIATED HPV REPORT: NORMAL
BKR LAB AP GYN ADEQUACY: NORMAL
BKR LAB AP GYN INTERPRETATION: NORMAL
BKR LAB AP LMP: NORMAL
BKR LAB AP PREVIOUS ABNL DX: NORMAL
BKR LAB AP PREVIOUS ABNORMAL: NORMAL
PATH REPORT.COMMENTS IMP SPEC: NORMAL
PATH REPORT.COMMENTS IMP SPEC: NORMAL
PATH REPORT.RELEVANT HX SPEC: NORMAL